# Patient Record
Sex: MALE | Race: BLACK OR AFRICAN AMERICAN | URBAN - METROPOLITAN AREA
[De-identification: names, ages, dates, MRNs, and addresses within clinical notes are randomized per-mention and may not be internally consistent; named-entity substitution may affect disease eponyms.]

---

## 2017-01-17 ENCOUNTER — TELEPHONE (OUTPATIENT)
Dept: FAMILY MEDICINE CLINIC | Age: 47
End: 2017-01-17

## 2017-01-26 LAB
25(OH)D3 SERPL-MCNC: 29.1 NG/ML (ref 32–100)
A-G RATIO,AGRAT: 1.4 RATIO (ref 1.1–2.6)
ABSOLUTE LYMPHOCYTE COUNT, 10803: 1.9 K/UL (ref 1–4.8)
ALBUMIN SERPL-MCNC: 4.6 G/DL (ref 3.5–5)
ALP SERPL-CCNC: 83 U/L (ref 25–115)
ALT SERPL-CCNC: 34 U/L (ref 5–40)
ANION GAP SERPL CALC-SCNC: 19 MMOL/L
AST SERPL W P-5'-P-CCNC: 30 U/L (ref 10–37)
AVG GLU, 10930: 125 MG/DL (ref 91–123)
BASOPHILS # BLD: 0 K/UL (ref 0–0.2)
BASOPHILS NFR BLD: 1 % (ref 0–2)
BILIRUB SERPL-MCNC: 0.2 MG/DL (ref 0.2–1.2)
BUN SERPL-MCNC: 16 MG/DL (ref 6–22)
CALCIUM SERPL-MCNC: 9.2 MG/DL (ref 8.4–10.4)
CHLORIDE SERPL-SCNC: 102 MMOL/L (ref 98–110)
CHOLEST SERPL-MCNC: 157 MG/DL (ref 110–200)
CO2 SERPL-SCNC: 22 MMOL/L (ref 20–32)
CREAT SERPL-MCNC: 0.9 MG/DL (ref 0.5–1.2)
EOSINOPHIL # BLD: 0.1 K/UL (ref 0–0.5)
EOSINOPHIL NFR BLD: 1 % (ref 0–6)
ERYTHROCYTE [DISTWIDTH] IN BLOOD BY AUTOMATED COUNT: 13.4 % (ref 10–16)
GFRAA, 66117: >60
GFRNA, 66118: >60
GLOBULIN,GLOB: 3.2 G/DL (ref 2–4)
GLUCOSE SERPL-MCNC: 92 MG/DL (ref 65–99)
GRANULOCYTES,GRANS: 47 % (ref 40–75)
HBA1C MFR BLD HPLC: 6 % (ref 4.8–5.9)
HCT VFR BLD AUTO: 42 % (ref 36.6–51.9)
HDLC SERPL-MCNC: 27 MG/DL (ref 40–59)
HGB BLD-MCNC: 14.2 G/DL (ref 13.2–17.3)
LDLC SERPL CALC-MCNC: 98 MG/DL (ref 50–99)
LYMPHOCYTES, LYMLT: 45 % (ref 27–45)
MCH RBC QN AUTO: 29 PG (ref 26–34)
MCHC RBC AUTO-ENTMCNC: 34 G/DL (ref 32–36)
MCV RBC AUTO: 85 FL (ref 80–95)
MONOCYTES # BLD: 0.3 K/UL (ref 0.1–0.9)
MONOCYTES NFR BLD: 6 % (ref 3–9)
NEUTROPHILS # BLD AUTO: 2 K/UL (ref 1.8–7.7)
PLATELET # BLD AUTO: 262 K/UL (ref 140–440)
PMV BLD AUTO: 10.3 FL (ref 6–10.8)
POTASSIUM SERPL-SCNC: 4.2 MMOL/L (ref 3.5–5.5)
PROT SERPL-MCNC: 7.8 G/DL (ref 6.4–8.3)
PSA SERPL-MCNC: 1.3 NG/ML
RBC # BLD AUTO: 4.96 M/UL (ref 3.8–5.8)
SODIUM SERPL-SCNC: 143 MMOL/L (ref 133–145)
T4 FREE SERPL-MCNC: 1.1 NG/DL (ref 0.9–1.8)
TRIGL SERPL-MCNC: 157 MG/DL (ref 40–149)
TSH SERPL DL<=0.005 MIU/L-ACNC: 1.89 MCU/ML (ref 0.27–4.2)
VLDLC SERPL CALC-MCNC: 31 MG/DL (ref 8–30)
WBC # BLD AUTO: 4.3 K/UL (ref 4–11)

## 2017-02-02 ENCOUNTER — OFFICE VISIT (OUTPATIENT)
Dept: FAMILY MEDICINE CLINIC | Age: 47
End: 2017-02-02

## 2017-02-02 VITALS
TEMPERATURE: 98.2 F | RESPIRATION RATE: 18 BRPM | DIASTOLIC BLOOD PRESSURE: 73 MMHG | HEIGHT: 68 IN | HEART RATE: 68 BPM | OXYGEN SATURATION: 97 % | BODY MASS INDEX: 29.77 KG/M2 | WEIGHT: 196.4 LBS | SYSTOLIC BLOOD PRESSURE: 136 MMHG

## 2017-02-02 DIAGNOSIS — Z23 ENCOUNTER FOR IMMUNIZATION: ICD-10-CM

## 2017-02-02 DIAGNOSIS — Z80.42 FAMILY HISTORY OF PROSTATE CANCER: ICD-10-CM

## 2017-02-02 DIAGNOSIS — Z00.00 ROUTINE HEALTH MAINTENANCE: Primary | ICD-10-CM

## 2017-02-02 LAB
PSA FREE MFR SERPL: 14 %
PSA FREE SERPL-MCNC: 0.14 NG/ML
PSA SERPL-MCNC: 1 NG/ML

## 2017-02-02 NOTE — MR AVS SNAPSHOT
Visit Information Date & Time Provider Department Dept. Phone Encounter #  
 2/2/2017 11:10 AM Shahla Yancey MD [email or web address] [phone] [id number] Upcoming Health Maintenance Date Due DTaP/Tdap/Td series (1 - Tdap) 1/30/1993 Allergies as of 2/2/2017  Review Complete On: 2/2/2017 By: Shahla Yancey MD  
 No Known Allergies Current Immunizations  Never Reviewed Name Date Tdap 2/2/2017 11:34 AM  
  
 Not reviewed this visit You Were Diagnosed With   
  
 Codes Comments Routine health maintenance    -  Primary ICD-10-CM: Z00.00 ICD-9-CM: V70.0 Encounter for immunization     ICD-10-CM: Q38 ICD-9-CM: V03.89 Vitals BP Pulse Temp Resp Height(growth percentile) Weight(growth percentile) 136/73 (BP 1 Location: Left arm, BP Patient Position: Sitting) 68 98.2 °F (36.8 °C) (Oral) 18 5' 8\" (1.727 m) 196 lb 6.4 oz (89.1 kg) SpO2 BMI Smoking Status 97% 29.86 kg/m2 Never Smoker Vitals History BMI and BSA Data Body Mass Index Body Surface Area  
 29.86 kg/m 2 2.07 m 2 Preferred Pharmacy Pharmacy Name Phone [facility] [address] [facility] Marcia Chappell 0001 [facility] 19 [phone] Your Updated Medication List  
  
   
This list is accurate as of: 2/2/17 11:57 AM.  Always use your most recent med list.  
  
  
  
  
 Ciclopirox-Nail Lacquer Removr 8 % Kit 1 Film by Apply Externally route every seven (7) days. Apply to adjacent skin and affected nails daily. Remove with alcohol every 7 days. FISH OIL 1,000 mg Cap Generic drug:  omega-3 fatty acids-vitamin e Take 1 Cap by mouth. ONE-A-DAY MEN'S MULTIVITAMIN PO Take  by mouth.  
  
 tadalafil 20 mg tablet Commonly known as:  CIALIS Take 20 mg by mouth as needed. We Performed the Following  TETANUS, DIPHTHERIA TOXOIDS AND ACELLULAR PERTUSSIS VACCINE (TDAP), IN INDIVIDS. >=7,  [id number] CPT(R)] Patient Instructions To Do: 
· Look at your multivitamin and see how much VitD is in each dose. And then. . I want to double it (using some extra Vitamin D) 333 [facility] Maxillofacial Surgery Gladys Nicholson, DDS Damaris Siddiqui, DMD [address] [place], [address] Tel: [phone] 
 
2400 [facility], [address] Tel: [phone] [facility] Maxillofacial Surgery [facility] Amy, [address] [facility] [address] [place], [address] Tel: [phone] [address] Tel: [phone] 
 - takes [address] for dental but not medical (TMJ is medical) [address] Oral Surgery Lurdesyannick Henderson, DDS Lorrane Cutting, DDS Rhesa Hook, [address] [place], [address] 
 
[address] [place], [address] Tel: [phone] Introducing \[facility] [place] [facility]\"" & HEALTH SERVICES! Art Gonzales introduces [employer] patient portal. Now you can access parts of your medical record, email your doctor's office, and request medication refills online. 1. In your internet browser, go to [email or web address] 2. Click on the First Time User? Click Here link in the Sign In box. You will see the New Member Sign Up page. 3. Enter your [employer] Access Code exactly as it appears below. You will not need to use this code after youve completed the sign-up process. If you do not sign up before the expiration date, you must request a new code. · [employer] Access Code: [email or web address] Expires: 5/3/2017 11:28 AM 
 
4. Enter the last four digits of your Social Security Number (xxxx) and Date of Birth (mm/dd/yyyy) as indicated and click Submit. You will be taken to the next sign-up page. 5. Create a MyChart ID. This will be your [employer] login ID and cannot be changed, so think of one that is secure and easy to remember. 6. Create a [employer] password. You can change your password at any time. 7. Enter your Password Reset Question and Answer. This can be used at a later time if you forget your password. 8. Enter your e-mail address. You will receive e-mail notification when new information is available in [address] 9. Click Sign Up. You can now view and download portions of your medical record. 10. Click the Download Summary menu link to download a portable copy of your medical information. If you have questions, please visit the Frequently Asked Questions section of the [employer] website. Remember, [employer] is NOT to be used for urgent needs. For medical emergencies, dial 911. Now available from your iPhone and Android! Please provide this summary of care documentation to your next provider. Your primary care clinician is listed as Gricelda Marlow. If you have any questions after today's visit, please call [phone].

## 2017-02-02 NOTE — PATIENT INSTRUCTIONS
To Do:  · Look at your multivitamin and see how much VitD is in each dose. And then. . I want to double it (using some extra Vitamin D)      1601 [facility]  JEN Myrick, AMY Nakinaa De [facility] [address] 2450 [facility], [address]  Tel: [phone]    113 [facility] Marshall [address]  Tel: [phone]    [facility]illofacial [facility]  JEN Hines [address], [address]  Tel: [phone]    [facility] [address]  [address]  [facility] [address]  Tel: [phone]   - takes [address] for dental but not medical (TMJ is medical)   400 [facility], 9500 [facility], [address]  Shayne Valdivia, [address] [place] [address], 310 [facility]    [address]  [facility], [address] [facility] [address]    Tel: [phone]

## 2017-02-02 NOTE — PROGRESS NOTES
3 [facility]  Primary Care Office Visit - Complete Physical    Milan Serna is a 41 y.o. male presenting for annual physical.  : 1970     Assessment/Plan:   Mallory Plasencia was seen today for complete physical.    Diagnoses and all orders for this visit:    Routine health maintenance  Up to date on health maintenance. Discussed role of PSA for prostate cancer screening in him, as someone who has a significant FHx of prostate cancer (father & PGF)    Encounter for immunization  -     Tetanus, diphtheria toxoids and acellular pertussis (TDAP) vaccine, in individuals >=7 years, IM      Management plan & patient instructions reviewed with Milan Serna, who voiced understanding. This document may have been created with the aid of dictation software. Text may contain errors, particularly phonetic errors. Sarah Navarrete MD  Internal Medicine, Family Medicine & Sports Medicine  2017, 11:44 AM    History:   Milan Serna is a 41 y.o. male presenting for an annual physical.    No complaints at all. Is exercising 5 days a week, 1-2 hours. Past Medical History   Diagnosis Date    Hypercholesterolemia      Past Surgical History   Procedure Laterality Date    Hx appendectomy  age 27      reports that he has never smoked. He has never used smokeless tobacco. He reports that he drinks alcohol. He reports that he does not use illicit drugs.   Social History     Social History Narrative     History   Smoking Status    Never Smoker   Smokeless Tobacco    Never Used     Family History   Problem Relation Age of Onset    Prostate Cancer Father      prostate     No Known Allergies    Problem List:      Patient Active Problem List    Diagnosis    Erectile dysfunction    Routine health maintenance    Onychomycosis of toenail    Family history of prostate cancer    Hyperlipidemia       Medications:     Current Outpatient Prescriptions   Medication Sig    MULTIVITS-MINERALS/FA/LYCOPENE (ONE-A-DAY MEN'S MULTIVITAMIN PO) Take  by mouth.  omega-3 fatty acids-vitamin e (FISH OIL) 1,000 mg cap Take 1 Cap by mouth.  tadalafil (CIALIS) 20 mg tablet Take 20 mg by mouth as needed.  Ciclopirox-Nail Lacquer Removr 8 % kit 1 Film by Apply Externally route every seven (7) days. Apply to adjacent skin and affected nails daily. Remove with alcohol every 7 days. No current facility-administered medications for this visit. Review of Systems:     Review of Systems   Constitutional: Negative. HENT: Negative. Eyes: Negative. Respiratory: Negative. Cardiovascular: Negative. Gastrointestinal: Negative. Genitourinary: Negative. Musculoskeletal: Negative. Skin: Negative. Neurological: Negative. Endo/Heme/Allergies: Negative. Psychiatric/Behavioral: Negative. Physical Assessment:   VS:    Visit Vitals    /73 (BP 1 Location: Left arm, BP Patient Position: Sitting)    Pulse 68    Temp 98.2 °F (36.8 °C) (Oral)    Resp 18    Ht 5' 8\" (1.727 m)    Wt 196 lb 6.4 oz (89.1 kg)    SpO2 97%    BMI 29.86 kg/m2       General:     Well-developed, well-nourished male, in NAD    Head:      PERRL, EOMI.  MMM, fair dentition, oropharynx otherwise WNL. No facial asymmetry noted. Ears:    Bilateral TMs wnl. Bilateral EACs wnl. Neck:      Neck supple, no thyromegaly  Cardiovasc:     No JVD. RRR, no MRG. Pulses 2+ and symmetric at distal extremities. Pulmonary:     Lungs clear bilaterally. Normal respiratory effort. Extremities:     No edema, no TTP bilateral calves. No joint effusions. LEs warm and well-perfused. Neuro:     Alert and oriented, CNs II-XII intact, no focal deficits. Skin:      No rashes noted. Psych:    pleasant, and conversant. Affect, mood & judgment appropriate.       Recent Labs & Imaging:     Lab Results   Component Value Date/Time    WBC 4.3 01/25/2017 01:17 PM    HGB 14.2 01/25/2017 01:17 PM    HCT 42.0 01/25/2017 01:17 PM PLATELET 766 74/44/8185 01:17 PM    MCV 85 01/25/2017 01:17 PM     Lab Results   Component Value Date/Time    Sodium 143 01/25/2017 01:17 PM    Potassium 4.2 01/25/2017 01:17 PM    Chloride 102 01/25/2017 01:17 PM    CO2 22 01/25/2017 01:17 PM    Anion gap 19.0 01/25/2017 01:17 PM    Glucose 92 01/25/2017 01:17 PM    BUN 16 01/25/2017 01:17 PM    Creatinine 0.9 01/25/2017 01:17 PM    BUN/Creatinine ratio 10 03/26/2014 12:00 AM    GFR est AA 90 03/26/2014 12:00 AM    GFR est non-AA 78 03/26/2014 12:00 AM    Calcium 9.2 01/25/2017 01:17 PM    Bilirubin, total 0.2 01/25/2017 01:17 PM    AST (SGOT) 30 01/25/2017 01:17 PM    Alk.  phosphatase 83 01/25/2017 01:17 PM    Protein, total 7.8 01/25/2017 01:17 PM    Albumin 4.6 01/25/2017 01:17 PM    Globulin 3.2 01/25/2017 01:17 PM    A-G Ratio 1.4 01/25/2017 01:17 PM    ALT (SGPT) 34 01/25/2017 01:17 PM     Lab Results   Component Value Date/Time    Cholesterol, total 157 01/25/2017 01:17 PM    HDL Cholesterol 27 01/25/2017 01:17 PM    LDL, calculated 98 01/25/2017 01:17 PM    VLDL, calculated 31 01/25/2017 01:17 PM    Triglyceride 157 01/25/2017 01:17 PM     Lab Results   Component Value Date/Time    TSH 1.89 01/25/2017 01:17 PM     Lab Results   Component Value Date/Time    Prostate Specific Ag 1.300 01/25/2017 01:17 PM    Prostate Specific Ag 1.0 01/25/2017 01:17 PM    Prostate Specific Ag 1.1 03/26/2014 12:00 AM     Lab Results   Component Value Date/Time    VITAMIN D, 25-HYDROXY 29.1 01/25/2017 01:17 PM

## 2017-02-02 NOTE — PROGRESS NOTES
Aby Thomas is a 41 y.o. male here for a cpe. Patient states he would like to be tested for prostate cancer and also would like some names of oral surgeons that remove wisdom teeth. 1. Have you been to the ER, urgent care clinic or hospitalized since your last visit? NO.     2. Have you seen or consulted any other health care providers outside of the [address] [place] [address] since your last visit (Include any pap smears or colon screening)? NO      Do you have an Advanced Directive? NO    Would you like information on Advanced Directives?  NO    Learning Assessment 3/12/2014   PRIMARY LEARNER Patient   HIGHEST LEVEL OF EDUCATION - PRIMARY LEARNER  GRADUATED HIGH SCHOOL OR GED   BARRIERS PRIMARY LEARNER NONE   PRIMARY LANGUAGE ENGLISH   LEARNER PREFERENCE PRIMARY READING   ANSWERED BY self   RELATIONSHIP SELF

## 2017-11-28 ENCOUNTER — OFFICE VISIT (OUTPATIENT)
Dept: FAMILY MEDICINE CLINIC | Age: 47
End: 2017-11-28

## 2017-11-28 VITALS
HEIGHT: 68 IN | WEIGHT: 192.8 LBS | BODY MASS INDEX: 29.22 KG/M2 | HEART RATE: 85 BPM | SYSTOLIC BLOOD PRESSURE: 138 MMHG | RESPIRATION RATE: 20 BRPM | OXYGEN SATURATION: 96 % | DIASTOLIC BLOOD PRESSURE: 89 MMHG | TEMPERATURE: 98.4 F

## 2017-11-28 DIAGNOSIS — J40 BRONCHITIS: Primary | ICD-10-CM

## 2017-11-28 DIAGNOSIS — R05.9 COUGH: ICD-10-CM

## 2017-11-28 DIAGNOSIS — H66.90 EAR INFECTION: ICD-10-CM

## 2017-11-28 RX ORDER — ALBUTEROL SULFATE 90 UG/1
1-2 AEROSOL, METERED RESPIRATORY (INHALATION)
Qty: 1 INHALER | Refills: 2 | Status: SHIPPED | OUTPATIENT
Start: 2017-11-28 | End: 2018-03-06 | Stop reason: SDUPTHER

## 2017-11-28 RX ORDER — AMOXICILLIN AND CLAVULANATE POTASSIUM 875; 125 MG/1; MG/1
1 TABLET, FILM COATED ORAL DAILY
Qty: 10 TAB | Refills: 0 | Status: SHIPPED | OUTPATIENT
Start: 2017-11-28 | End: 2017-12-08

## 2017-11-28 RX ORDER — ALBUTEROL SULFATE 0.83 MG/ML
2.5 SOLUTION RESPIRATORY (INHALATION) ONCE
Qty: 1 EACH | Refills: 0
Start: 2017-11-28 | End: 2022-06-10 | Stop reason: SDUPTHER

## 2017-11-28 NOTE — PROGRESS NOTES
[facility]  Primary Care Office Visit - Problem-Oriented    : 1970   Daya Camargo is a 41 y.o. male presenting for  Chief Complaint   Patient presents with    Hoarse     x 4-5 days    Cold Symptoms       Assessment/Plan:       ICD-10-CM ICD-9-CM   1. Bronchitis - initial encounter J40 490   2. Cough - initial encounter R05 786.2   3. Ear infection - initial encounter H66.90 382.9     Start ABx (augmentin), albuterol prn with spacer. Given instructions re: self care. Follow-up 2 weeks. Orders Placed This Encounter    INHAL RX, AIRWAY OBST/DX SPUTUM INDUCT ([id number])    ALBUTEROL, INHAL. RAI.()     Order Specific Question:   Dose     Answer:   2.5 mg     Order Specific Question:   Site     Answer:   OTHER     Comments:   oral inhalation     Order Specific Question:   Expiration Date     Answer:   2019     Order Specific Question:   Lot#     Answer:   [id number]     Order Specific Question:        Answer:   [facility] [employer]     Order Specific Question:   Charge Quantity? Answer:   1     Order Specific Question:   Perfomed by/Witnessed by: Answer:   BANDAR Augustin LPN     Order Specific Question:   NDC#     Answer:   5895-4100-74    albuterol (PROVENTIL VENTOLIN) 2.5 mg /3 mL (0.083 %) nebulizer solution     Sig: 3 mL by Nebulization route once for 1 dose. Dispense:  1 Each     Refill:  0    albuterol (PROVENTIL HFA, VENTOLIN HFA, PROAIR HFA) 90 mcg/actuation inhaler     Sig: Take 1-2 Puffs by inhalation every six (6) hours as needed for Wheezing or Shortness of Breath (cough). Dispense:  1 Inhaler     Refill:  2    amoxicillin-clavulanate (AUGMENTIN) 875-125 mg per tablet     Sig: Take 1 Tab by mouth daily for 10 days. Dispense:  10 Tab     Refill:  0    inhalational spacing device (AEROCHAMBER MV)     Si Each by Does Not Apply route as needed.      Dispense:  1 Device     Refill:  0         This document may have been created with the aid of dictation software. Text may contain errors, particularly phonetic errors. Reviewed management plan & instructions with patient, who voiced understanding. Kenzie Mera MD  Internal Medicine, Family Medicine & Sports Medicine  11/28/2017, 2:00 PM    Patient Instructions (provided in AVS): To Do:  ·  start your antibiotics  · Use your albuterol as needed with spacer    Bronchitis: Care Instructions  Ear Infection (Otitis Media): Care Instructions    History:   Jana Damico is a 41 y.o. male presenting to address:  Chief Complaint   Patient presents with    Hoarse     x 4-5 days    Cold Symptoms       Cough with dyspnea and wheeze x 4-5 days. Runny nose  + subjective fever    Past Medical History:   Diagnosis Date    Hypercholesterolemia      Past Surgical History:   Procedure Laterality Date    HX APPENDECTOMY  age 27      reports that he has never smoked. He has never used smokeless tobacco. He reports that he drinks alcohol. He reports that he does not use illicit drugs. Social History     Social History Narrative     History   Smoking Status    Never Smoker   Smokeless Tobacco    Never Used     Family History   Problem Relation Age of Onset    Prostate Cancer Father      prostate     No Known Allergies    Problem List:      Patient Active Problem List    Diagnosis    Erectile dysfunction    Routine health maintenance    Onychomycosis of toenail    Family history of prostate cancer       Medications:     Current Outpatient Prescriptions   Medication Sig    MULTIVITS-MINERALS/FA/LYCOPENE (ONE-A-DAY MEN'S MULTIVITAMIN PO) Take  by mouth.  omega-3 fatty acids-vitamin e (FISH OIL) 1,000 mg cap Take 1 Cap by mouth.  tadalafil (CIALIS) 20 mg tablet Take 20 mg by mouth as needed.  Ciclopirox-Nail Lacquer Removr 8 % kit 1 Film by Apply Externally route every seven (7) days. Apply to adjacent skin and affected nails daily. Remove with alcohol every 7 days.      No current facility-administered medications for this visit. Review of Systems:     Review of Systems   Constitutional: Positive for fever and malaise/fatigue. Negative for chills. Eyes: Positive for redness. Respiratory: Positive for cough, shortness of breath and wheezing. Gastrointestinal: Negative for abdominal pain. Musculoskeletal: Negative. Skin: Negative for rash. Neurological: Negative. Psychiatric/Behavioral: Negative. Physical Assessment:   VS:    Vitals:    11/28/17 1340 11/28/17 1410   BP: (!) 163/92 138/89   Pulse: 85    Resp: 20    Temp: 98.4 °F (36.9 °C)    TempSrc: Oral    SpO2: 96%    Weight: 192 lb 12.8 oz (87.5 kg)    Height: 5' 8\" (1.727 m)    PF:  420 L/min   PainSc:   8    PainLoc: Throat        Physical Exam   Constitutional: He is oriented to person, place, and time. He appears well-developed and well-nourished. He appears ill. No distress. HENT:   Head: Normocephalic and atraumatic. Right Ear: Tympanic membrane is erythematous. Tympanic membrane mobility is abnormal.   Left Ear: Tympanic membrane is erythematous. Tympanic membrane mobility is abnormal.   Mouth/Throat: Posterior oropharyngeal erythema present. Eyes: EOM are normal. Pupils are equal, round, and reactive to light. Right conjunctiva is injected. Neck: Normal range of motion. Neck supple. Cardiovascular: Normal rate, regular rhythm and normal heart sounds. No murmur heard. Pulmonary/Chest: Effort normal. No respiratory distress. He has decreased breath sounds. He has no wheezes. Significantly improved aeration B lung fields after albuterol neb treatment x 1   Abdominal: Soft. Bowel sounds are normal. There is no tenderness. There is no rebound. Musculoskeletal: Normal range of motion. Lymphadenopathy:        Head (right side): Submandibular and tonsillar adenopathy present. Head (left side): Tonsillar adenopathy present. No submandibular adenopathy present.      He has cervical adenopathy. Right cervical: Superficial cervical adenopathy present. Left cervical: No superficial cervical adenopathy present. + tender lymphadenopathy   Neurological: He is oriented to person, place, and time. No cranial nerve deficit. Skin: Skin is warm and dry. He is not diaphoretic. Psychiatric: He has a normal mood and affect. His behavior is normal. Judgment and thought content normal.   Nursing note reviewed.

## 2017-11-28 NOTE — PATIENT INSTRUCTIONS
To Do:  ·  start your antibiotics  · Use your albuterol as needed with spacer         Bronchitis: Care Instructions  Your Care Instructions    Bronchitis is inflammation of the bronchial tubes, which carry air to the lungs. The tubes swell and produce mucus, or phlegm. The mucus and inflamed bronchial tubes make you cough. You may have trouble breathing. Most cases of bronchitis are caused by viruses like those that cause colds. Antibiotics usually do not help and they may be harmful. Bronchitis usually develops rapidly and lasts about 2 to 3 weeks in otherwise healthy people. Follow-up care is a key part of your treatment and safety. Be sure to make and go to all appointments, and call your doctor if you are having problems. It's also a good idea to know your test results and keep a list of the medicines you take. How can you care for yourself at home? · Take all medicines exactly as prescribed. Call your doctor if you think you are having a problem with your medicine. · Get some extra rest.  · Take an over-the-counter pain medicine, such as acetaminophen (Tylenol), ibuprofen (Advil, Motrin), or naproxen (Aleve) to reduce fever and relieve body aches. Read and follow all instructions on the label. · Do not take two or more pain medicines at the same time unless the doctor told you to. Many pain medicines have acetaminophen, which is Tylenol. Too much acetaminophen (Tylenol) can be harmful. · Take an over-the-counter cough medicine that contains dextromethorphan to help quiet a dry, hacking cough so that you can sleep. Avoid cough medicines that have more than one active ingredient. Read and follow all instructions on the label. · Breathe moist air from a humidifier, hot shower, or sink filled with hot water. The heat and moisture will thin mucus so you can cough it out. · Do not smoke. Smoking can make bronchitis worse.  If you need help quitting, talk to your doctor about stop-smoking programs and medicines. These can increase your chances of quitting for good. When should you call for help? Call 911 anytime you think you may need emergency care. For example, call if:  ? · You have severe trouble breathing. ?Call your doctor now or seek immediate medical care if:  ? · You have new or worse trouble breathing. ? · You cough up dark brown or bloody mucus (sputum). ? · You have a new or higher fever. ? · You have a new rash. ? Watch closely for changes in your health, and be sure to contact your doctor if:  ? · You cough more deeply or more often, especially if you notice more mucus or a change in the color of your mucus. ? · You are not getting better as expected. Where can you learn more? Go to http://surendra-campos.info/. Enter H333 in the search box to learn more about \"Bronchitis: Care Instructions. \"  Current as of: May 12, 2017  Content Version: 11.4  © 5174-3044 [employer]. Care instructions adapted under license by [employer] (which disclaims liability or warranty for this information). If you have questions about a medical condition or this instruction, always ask your healthcare professional. [place] [address] any warranty or liability for your use of this information. Ear Infection (Otitis Media): Care Instructions  Your Care Instructions    An ear infection may start with a cold and affect the middle ear (otitis media). It can hurt a lot. Most ear infections clear up on their own in a couple of days. Most often you will not need antibiotics. This is because many ear infections are caused by a virus. Antibiotics don't work against a virus. Regular doses of pain medicines are the best way to reduce your fever and help you feel better. Follow-up care is a key part of your treatment and safety. Be sure to make and go to all appointments, and call your doctor if you are having problems.  It's also a good idea to know your test results and keep a list of the medicines you take. How can you care for yourself at home? · Take pain medicines exactly as directed. ¨ If the doctor gave you a prescription medicine for pain, take it as prescribed. ¨ If you are not taking a prescription pain medicine, take an over-the-counter medicine, such as acetaminophen (Tylenol), ibuprofen (Advil, Motrin), or naproxen (Aleve). Read and follow all instructions on the label. ¨ Do not take two or more pain medicines at the same time unless the doctor told you to. Many pain medicines have acetaminophen, which is Tylenol. Too much acetaminophen (Tylenol) can be harmful. · Plan to take a full dose of pain reliever before bedtime. Getting enough sleep will help you get better. · Try a warm, moist washcloth on the ear. It may help relieve pain. · If your doctor prescribed antibiotics, take them as directed. Do not stop taking them just because you feel better. You need to take the full course of antibiotics. When should you call for help? Call your doctor now or seek immediate medical care if:  ? · You have new or increasing ear pain. ? · You have new or increasing pus or blood draining from your ear. ? · You have a fever with a stiff neck or a severe headache. ? Watch closely for changes in your health, and be sure to contact your doctor if:  ? · You have new or worse symptoms. ? · You are not getting better after taking an antibiotic for 2 days. Where can you learn more? Go to http://surendra-campos.info/. Enter W577 in the search box to learn more about \"Ear Infection (Otitis Media): Care Instructions. \"  Current as of: May 12, 2017  Content Version: 11.4  © 4470-3680 [employer]andbase. Care instructions adapted under license by [employer] (which disclaims liability or warranty for this information).  If you have questions about a medical condition or this instruction, always ask your healthcare professional. [address] any warranty or liability for your use of this information.

## 2017-11-28 NOTE — PROGRESS NOTES
Rona Fernandez is a 41 y.o. male (: 1970) presenting to address:    Chief Complaint   Patient presents with    Hoarse     x 4-5 days    Cold Symptoms       Vitals:    17 1340   Weight: 192 lb 12.8 oz (87.5 kg)   Height: 5' 8\" (1.727 m)   PainSc:   8   PainLoc: Throat       Hearing/Vision:   No exam data present    Learning Assessment:     Learning Assessment 3/12/2014   PRIMARY LEARNER Patient   HIGHEST LEVEL OF EDUCATION - PRIMARY LEARNER  GRADUATED HIGH SCHOOL OR GED   BARRIERS PRIMARY LEARNER NONE   PRIMARY LANGUAGE ENGLISH   LEARNER PREFERENCE PRIMARY READING   ANSWERED BY self   RELATIONSHIP SELF     Depression Screening:     PHQ over the last two weeks 2017   Little interest or pleasure in doing things Not at all   Feeling down, depressed or hopeless Not at all   Total Score PHQ 2 0     Fall Risk Assessment:   No flowsheet data found. Abuse Screening:     Abuse Screening Questionnaire 3/12/2014   Do you ever feel afraid of your partner? N   Are you in a relationship with someone who physically or mentally threatens you? N   Is it safe for you to go home? Y     Coordination of Care Questionaire:   1. Have you been to the ER, urgent care clinic since your last visit? Hospitalized since your last visit? NO    2. Have you seen or consulted any other health care providers outside of the [address] [place] [address] since your last visit? Include any pap smears or colon screening. NO    Advanced Directive:   1. Do you have an Advanced Directive? NO    2. Would you like information on Advanced Directives?  NO

## 2017-11-28 NOTE — MR AVS SNAPSHOT
Visit Information Date & Time Provider Department Dept. Phone Encounter #  
 11/28/2017  1:50 PM Daniel Byers, [facility] [phone] [id number] Upcoming Health Maintenance Date Due Influenza Age 5 to Adult 8/1/2017 DTaP/Tdap/Td series (2 - Td) 2/2/2027 Allergies as of 11/28/2017  Review Complete On: 11/28/2017 By: Daniel Byers MD  
 No Known Allergies Current Immunizations  Never Reviewed Name Date Tdap 2/2/2017 11:34 AM  
  
 Not reviewed this visit You Were Diagnosed With   
  
 Codes Comments Bronchitis    -  Primary ICD-10-CM: W70 ICD-9-CM: 469 Cough     ICD-10-CM: R05 ICD-9-CM: 786.2 Ear infection     ICD-10-CM: H66.90 ICD-9-CM: 382. 9 Vitals BP Pulse Temp Resp Height(growth percentile) Weight(growth percentile) 138/89 (BP 1 Location: Right arm, BP Patient Position: Sitting) 85 98.4 °F (36.9 °C) (Oral) 20 5' 8\" (1.727 m) 192 lb 12.8 oz (87.5 kg) SpO2 PF BMI Smoking Status 96% 420 L/min 29.32 kg/m2 Never Smoker Vitals History BMI and BSA Data Body Mass Index Body Surface Area  
 29.32 kg/m 2 2.05 m 2 Preferred Pharmacy Pharmacy Name Phone [facility] [address] [address] [phone] Your Updated Medication List  
  
   
This list is accurate as of: 11/28/17  2:45 PM.  Always use your most recent med list.  
  
  
  
  
 * albuterol 2.5 mg /3 mL (0.083 %) nebulizer solution Commonly known as:  PROVENTIL VENTOLIN  
3 mL by Nebulization route once for 1 dose. * albuterol 90 mcg/actuation inhaler Commonly known as:  PROVENTIL HFA, VENTOLIN HFA, PROAIR HFA Take 1-2 Puffs by inhalation every six (6) hours as needed for Wheezing or Shortness of Breath (cough). amoxicillin-clavulanate 875-125 mg per tablet Commonly known as:  AUGMENTIN Take 1 Tab by mouth daily for 10 days. Ciclopirox-Nail Lacquer Removr 8 % Kit 1 Film by Apply Externally route every seven (7) days. Apply to adjacent skin and affected nails daily. Remove with alcohol every 7 days. FISH OIL 1,000 mg Cap Generic drug:  omega-3 fatty acids-vitamin e Take 1 Cap by mouth. inhalational spacing device Commonly known as:  AEROCHAMBER MV  
1 Each by Does Not Apply route as needed. ONE-A-DAY MEN'S MULTIVITAMIN PO Take  by mouth.  
  
 tadalafil 20 mg tablet Commonly known as:  CIALIS Take 20 mg by mouth as needed. * Notice: This list has 2 medication(s) that are the same as other medications prescribed for you. Read the directions carefully, and ask your doctor or other care provider to review them with you. Prescriptions Sent to Pharmacy Refills  
 albuterol (PROVENTIL HFA, VENTOLIN HFA, PROAIR HFA) 90 mcg/actuation inhaler 2 Sig: Take 1-2 Puffs by inhalation every six (6) hours as needed for Wheezing or Shortness of Breath (cough). Class: Normal  
 Pharmacy: [facility] [place] [address] [facility] Ph #: [phone] Route: Inhalation  
 amoxicillin-clavulanate (AUGMENTIN) 875-125 mg per tablet 0 Sig: Take 1 Tab by mouth daily for 10 days. Class: Normal  
 Pharmacy: [facility] [place] [address] [facility] Ph #: [phone] Route: Oral  
 inhalational spacing device (AEROCHAMBER MV) 0 Si Each by Does Not Apply route as needed. Class: Normal  
 Pharmacy: [facility] [place] [address] [facility] Ph #: [phone] Route: Does Not Apply We Performed the Following ALBUTEROL, INHAL. SOL., FDA-APPROVED FINAL, NON-COMPOUND UNIT DOSE, 1 MG [ [facility]] INHAL RX, AIRWAY OBST/DX SPUTUM INDUCT [id number] CPT(R)] Patient Instructions To Do: 
·  start your antibiotics · Use your albuterol as needed with spacer Bronchitis: Care Instructions Your Care Instructions Bronchitis is inflammation of the bronchial tubes, which carry air to the lungs. The tubes swell and produce mucus, or phlegm. The mucus and inflamed bronchial tubes make you cough. You may have trouble breathing. Most cases of bronchitis are caused by viruses like those that cause colds. Antibiotics usually do not help and they may be harmful. Bronchitis usually develops rapidly and lasts about 2 to 3 weeks in otherwise healthy people. Follow-up care is a key part of your treatment and safety. Be sure to make and go to all appointments, and call your doctor if you are having problems. It's also a good idea to know your test results and keep a list of the medicines you take. How can you care for yourself at home? · Take all medicines exactly as prescribed. Call your doctor if you think you are having a problem with your medicine. · Get some extra rest. 
· Take an over-the-counter pain medicine, such as acetaminophen (Tylenol), ibuprofen (Advil, Motrin), or naproxen (Aleve) to reduce fever and relieve body aches. Read and follow all instructions on the label. · Do not take two or more pain medicines at the same time unless the doctor told you to. Many pain medicines have acetaminophen, which is Tylenol. Too much acetaminophen (Tylenol) can be harmful. · Take an over-the-counter cough medicine that contains dextromethorphan to help quiet a dry, hacking cough so that you can sleep. Avoid cough medicines that have more than one active ingredient. Read and follow all instructions on the label. · Breathe moist air from a humidifier, hot shower, or sink filled with hot water. The heat and moisture will thin mucus so you can cough it out. · Do not smoke. Smoking can make bronchitis worse. If you need help quitting, talk to your doctor about stop-smoking programs and medicines. These can increase your chances of quitting for good. When should you call for help? Call 911 anytime you think you may need emergency care. For example, call if: 
? · You have severe trouble breathing. ?Call your doctor now or seek immediate medical care if: 
? · You have new or worse trouble breathing. ? · You cough up dark brown or bloody mucus (sputum). ? · You have a new or higher fever. ? · You have a new rash. ? Watch closely for changes in your health, and be sure to contact your doctor if: 
? · You cough more deeply or more often, especially if you notice more mucus or a change in the color of your mucus. ? · You are not getting better as expected. Where can you learn more? Go to http://surendra-campos.info/. Enter H333 in the search box to learn more about \"Bronchitis: Care Instructions. \" Current as of: May 12, 2017 Content Version: 11.4 © 9053-5055 iSpecimen. Care instructions adapted under license by [employer] (which disclaims liability or warranty for this information). If you have questions about a medical condition or this instruction, always ask your healthcare professional. [place] [address] any warranty or liability for your use of this information. Ear Infection (Otitis Media): Care Instructions Your Care Instructions An ear infection may start with a cold and affect the middle ear (otitis media). It can hurt a lot. Most ear infections clear up on their own in a couple of days. Most often you will not need antibiotics. This is because many ear infections are caused by a virus. Antibiotics don't work against a virus. Regular doses of pain medicines are the best way to reduce your fever and help you feel better. Follow-up care is a key part of your treatment and safety.  Be sure to make and go to all appointments, and call your doctor if you are having problems. It's also a good idea to know your test results and keep a list of the medicines you take. How can you care for yourself at home? · Take pain medicines exactly as directed. ¨ If the doctor gave you a prescription medicine for pain, take it as prescribed. ¨ If you are not taking a prescription pain medicine, take an over-the-counter medicine, such as acetaminophen (Tylenol), ibuprofen (Advil, Motrin), or naproxen (Aleve). Read and follow all instructions on the label. ¨ Do not take two or more pain medicines at the same time unless the doctor told you to. Many pain medicines have acetaminophen, which is Tylenol. Too much acetaminophen (Tylenol) can be harmful. · Plan to take a full dose of pain reliever before bedtime. Getting enough sleep will help you get better. · Try a warm, moist washcloth on the ear. It may help relieve pain. · If your doctor prescribed antibiotics, take them as directed. Do not stop taking them just because you feel better. You need to take the full course of antibiotics. When should you call for help? Call your doctor now or seek immediate medical care if: 
? · You have new or increasing ear pain. ? · You have new or increasing pus or blood draining from your ear. ? · You have a fever with a stiff neck or a severe headache. ? Watch closely for changes in your health, and be sure to contact your doctor if: 
? · You have new or worse symptoms. ? · You are not getting better after taking an antibiotic for 2 days. Where can you learn more? Go to http://surendra-campos.info/. Enter I779 in the search box to learn more about \"Ear Infection (Otitis Media): Care Instructions. \" Current as of: May 12, 2017 Content Version: 11.4 © 0464-0083 [employer]ee. Care instructions adapted under license by [employer] (which disclaims liability or warranty for this information).  If you have questions about a medical condition or this instruction, always ask your healthcare professional. [facility] any warranty or liability for your use of this information. Introducing [facility]! Jerod Dimas introduces Appydrink patient portal. Now you can access parts of your medical record, email your doctor's office, and request medication refills online. 1. In your internet browser, go to [email or web address] 2. Click on the First Time User? Click Here link in the Sign In box. You will see the New Member Sign Up page. 3. Enter your Appydrink Access Code exactly as it appears below. You will not need to use this code after youve completed the sign-up process. If you do not sign up before the expiration date, you must request a new code. · Appydrink Access Code: [email or web address] Expires: 2/26/2018  2:45 PM 
 
4. Enter the last four digits of your Social Security Number (xxxx) and Date of Birth (mm/dd/yyyy) as indicated and click Submit. You will be taken to the next sign-up page. 5. Create a Appydrink ID. This will be your Appydrink login ID and cannot be changed, so think of one that is secure and easy to remember. 6. Create a Appydrink password. You can change your password at any time. 7. Enter your Password Reset Question and Answer. This can be used at a later time if you forget your password. 8. Enter your e-mail address. You will receive e-mail notification when new information is available in [address] 9. Click Sign Up. You can now view and download portions of your medical record. 10. Click the Download Summary menu link to download a portable copy of your medical information. If you have questions, please visit the Frequently Asked Questions section of the Appydrink website. Remember, Appydrink is NOT to be used for urgent needs. For medical emergencies, dial 911. Now available from your iPhone and Android! Please provide this summary of care documentation to your next provider. Your primary care clinician is listed as Sabrina Ruvalcaba. If you have any questions after today's visit, please call [phone].

## 2017-12-26 ENCOUNTER — OFFICE VISIT (OUTPATIENT)
Dept: FAMILY MEDICINE CLINIC | Age: 47
End: 2017-12-26

## 2017-12-26 VITALS
BODY MASS INDEX: 28.76 KG/M2 | TEMPERATURE: 97.8 F | HEART RATE: 69 BPM | SYSTOLIC BLOOD PRESSURE: 124 MMHG | OXYGEN SATURATION: 98 % | WEIGHT: 189.8 LBS | HEIGHT: 68 IN | DIASTOLIC BLOOD PRESSURE: 73 MMHG | RESPIRATION RATE: 18 BRPM

## 2017-12-26 DIAGNOSIS — R03.0 ELEVATED BLOOD PRESSURE READING: ICD-10-CM

## 2017-12-26 DIAGNOSIS — R06.83 SNORING: ICD-10-CM

## 2017-12-26 DIAGNOSIS — L98.9 SKIN LESION OF FOOT: ICD-10-CM

## 2017-12-26 DIAGNOSIS — B35.1 ONYCHOMYCOSIS OF TOENAIL: Primary | ICD-10-CM

## 2017-12-26 DIAGNOSIS — R53.83 FATIGUE, UNSPECIFIED TYPE: ICD-10-CM

## 2017-12-26 NOTE — PROGRESS NOTES
[address] [facility]  Primary Care Office Visit - Problem-Oriented    : 1970   Bennett Ortiz is a 41 y.o. male presenting for  Chief Complaint   Patient presents with    Cough     follow up. \"doing much better, still have mucus\"       Assessment/Plan:       ICD-10-CM   1. Onychomycosis of toenail, right - ongoing  - unresponsive to topical antifungals in the past, requesting podiatry referral     B35.1   2. Skin lesion of foot - ongoing  - likely tinea, requesting podiatry referral     L98.9   3. Snoring - new issue R06.83   4. Fatigue, unspecified type - new issue, daytime sleepiness R53.83   5. Elevated blood pressure reading in the past - normotensive today  - sleep medicine for eval R03.0       Orders Placed This Encounter    REFERRAL TO PODIATRY     Referral Priority:   Routine     Referral Type:   Consultation     Referral Reason:   Specialty Services Required     Referred to Provider:   Robert Green DPM    SLEEP MEDICINE REFERRAL     Referral Priority:   Routine     Referral Type:   Consultation     Referral Reason:   Specialty Services Required     Referral Location:   [facility]     Referred to Provider:   Amber Rubio MD         This document may have been created with the aid of dictation software. Text may contain errors, particularly phonetic errors. Reviewed management plan & instructions with patient, who voiced understanding. Guy Sams MD  Internal Medicine, Family Medicine & Sports Medicine  2017, 7:45 AM    Patient Instructions (provided in AVS): To Do: · The podiatrist & the sleep specialist should be calling you in the next 1-2 weeks. If you don't hear from them, you can call them directly. Toenail Fungus: Care Instructions  Snoring: Care Instructions    History:   Bennett Ortiz is a 41 y.o. male presenting to address:  Chief Complaint   Patient presents with    Cough     follow up.   \"doing much better, still have mucus\"       Ongoing cough, although much better than before. Rare use of albuterol. Concerned about his R foot, mainly ongoing great toenail fungus, occasionally painful. Also concerned about ongoing R skin issues. Would like a referral to sleep specialist.  + snoring  Does not have anyone to ask re: witnessed apnea while sleeping. Does wake up feeling unrested. Past Medical History:   Diagnosis Date    Hypercholesterolemia      Past Surgical History:   Procedure Laterality Date    HX APPENDECTOMY  age 27      reports that he has never smoked. He has never used smokeless tobacco. He reports that he drinks alcohol. He reports that he does not use illicit drugs. Social History     Social History Narrative     History   Smoking Status    Never Smoker   Smokeless Tobacco    Never Used     Family History   Problem Relation Age of Onset    Prostate Cancer Father      prostate     No Known Allergies    Problem List:      Patient Active Problem List    Diagnosis    Erectile dysfunction    Routine health maintenance    Onychomycosis of toenail    Family history of prostate cancer       Medications:     Current Outpatient Prescriptions   Medication Sig    albuterol (PROVENTIL HFA, VENTOLIN HFA, PROAIR HFA) 90 mcg/actuation inhaler Take 1-2 Puffs by inhalation every six (6) hours as needed for Wheezing or Shortness of Breath (cough).  inhalational spacing device (AEROCHAMBER MV) 1 Each by Does Not Apply route as needed.  MULTIVITS-MINERALS/FA/LYCOPENE (ONE-A-DAY MEN'S MULTIVITAMIN PO) Take  by mouth.  omega-3 fatty acids-vitamin e (FISH OIL) 1,000 mg cap Take 1 Cap by mouth. No current facility-administered medications for this visit. Review of Systems:     Review of Systems   Constitutional: Negative for chills and fever. HENT: Negative for ear pain and sore throat. Respiratory: Positive for cough (much improved from before). Negative for shortness of breath. Cardiovascular: Negative for chest pain and palpitations. Gastrointestinal: Negative for abdominal pain. Genitourinary: Negative for dysuria. Musculoskeletal: Negative for myalgias. Skin: Positive for rash (R foot, nail & skin). Neurological: Negative for speech change, focal weakness and headaches. Endo/Heme/Allergies: Does not bruise/bleed easily. Psychiatric/Behavioral: Negative for depression. The patient is not nervous/anxious and does not have insomnia. Physical Assessment:   VS:    Vitals:    12/26/17 0735   BP: 124/73   Pulse: 69   Resp: 18   Temp: 97.8 °F (36.6 °C)   TempSrc: Oral   SpO2: 98%   Weight: 189 lb 12.8 oz (86.1 kg)   Height: 5' 8\" (1.727 m)   PainSc:   0 - No pain       Physical Exam   Constitutional: He is oriented to person, place, and time. He appears well-developed and well-nourished. No distress. HENT:   Head: Normocephalic and atraumatic. Right Ear: External ear normal.   Left Ear: External ear normal.   Mouth/Throat: Oropharynx is clear and moist.   Eyes: EOM are normal. Pupils are equal, round, and reactive to light. Neck: Normal range of motion. Neck supple. Cardiovascular: Normal rate, regular rhythm and normal heart sounds. No murmur heard. Pulmonary/Chest: Effort normal and breath sounds normal. No respiratory distress. He has no wheezes. Abdominal: Soft. Bowel sounds are normal. There is no tenderness. There is no rebound. Musculoskeletal: Normal range of motion. Neurological: He is alert and oriented to person, place, and time. No cranial nerve deficit. Skin: Skin is warm and dry. Rash noted. He is not diaphoretic.   + areas of localized scaling of R foot, including plantar surface  + R great toenail thickened, discolored, brittle   Psychiatric: He has a normal mood and affect. His behavior is normal. Judgment and thought content normal.   Nursing note reviewed.

## 2017-12-26 NOTE — MR AVS SNAPSHOT
Visit Information Date & Time Provider Department Dept. Phone Encounter #  
 12/26/2017  7:30 AM Farheen Melinda, [facility] [phone] [id number] Follow-up Instructions Return if symptoms worsen or fail to improve. Upcoming Health Maintenance Date Due DTaP/Tdap/Td series (2 - Td) 2/2/2027 Allergies as of 12/26/2017  Review Complete On: 12/26/2017 By: Farheen Lawrence MD  
 No Known Allergies Current Immunizations  Never Reviewed Name Date Tdap 2/2/2017 11:34 AM  
  
 Not reviewed this visit You Were Diagnosed With   
  
 Codes Comments Onychomycosis of toenail    -  Primary ICD-10-CM: B35.1 ICD-9-CM: 110.1 Skin lesion of foot     ICD-10-CM: L98.9 ICD-9-CM: 709.9 Snoring     ICD-10-CM: R06.83 
ICD-9-CM: 786.09 Fatigue, unspecified type     ICD-10-CM: R53.83 ICD-9-CM: 780.79 Elevated blood pressure reading     ICD-10-CM: R03.0 ICD-9-CM: 796.2 Vitals BP Pulse Temp Resp Height(growth percentile) Weight(growth percentile) 124/73 (BP 1 Location: Left arm, BP Patient Position: Sitting) 69 97.8 °F (36.6 °C) (Oral) 18 5' 8\" (1.727 m) 189 lb 12.8 oz (86.1 kg) SpO2 BMI Smoking Status 98% 28.86 kg/m2 Never Smoker Vitals History BMI and BSA Data Body Mass Index Body Surface Area  
 28.86 kg/m 2 2.03 m 2 Preferred Pharmacy Pharmacy Name Phone [facility] [phone] [phone] Your Updated Medication List  
  
   
This list is accurate as of: 12/26/17  7:54 AM.  Always use your most recent med list.  
  
  
  
  
 albuterol 90 mcg/actuation inhaler Commonly known as:  PROVENTIL HFA, VENTOLIN HFA, PROAIR HFA Take 1-2 Puffs by inhalation every six (6) hours as needed for Wheezing or Shortness of Breath (cough). FISH OIL 1,000 mg Cap Generic drug:  omega-3 fatty acids-vitamin e  
 Take 1 Cap by mouth. inhalational spacing device Commonly known as:  AEROCHAMBER MV  
1 Each by Does Not Apply route as needed. ONE-A-DAY MEN'S MULTIVITAMIN PO Take  by mouth. We Performed the Following REFERRAL TO PODIATRY [REF90 Custom] SLEEP MEDICINE REFERRAL [LQE942 Custom] Comments:  
 Orders: 
Sleep Medicine Consult - Schedule patient for a sleep specialist consult. If appropriate, schedule patient for sleep study(s). Initiate treatment if needed. Forward correspondance to my office. Follow-up Instructions Return if symptoms worsen or fail to improve. Referral Information Referral ID Referred By Referred To  
  
 [id number] Ángela WOODS, [address] [place] [address] [facility], [facility] [address] Phone: [phone] Fax: [phone] Visits Status Start Date End Date 1 New Request 12/26/17 12/26/18 If your referral has a status of pending review or denied, additional information will be sent to support the outcome of this decision. Referral ID Referred By Referred To  
 [id number] Maryjane Gregory [facility] [address] Phone: [phone] Fax: [phone] Visits Status Start Date End Date 1 New Request 12/26/17 12/26/18 If your referral has a status of pending review or denied, additional information will be sent to support the outcome of this decision. Patient Instructions To Do: · The podiatrist & the sleep specialist should be calling you in the next 1-2 weeks. If you don't hear from them, you can call them directly. Toenail Fungus: Care Instructions Your Care Instructions A toenail that is infected by a fungus usually turns white or yellow.  As the fungus spreads, the nail turns a darker color and gets thicker, and its edges start to turn ragged and crumble. A bad infection can cause toe pain, and the nail may pull away from the toe. Toenails that are exposed to moisture and warmth a lot are more likely to get infected by a fungus. This can happen from wearing sweaty shoes often and from walking barefoot on shower floors. It is hard to treat toenail fungus, and the infection can return after it has cleared up. But medicines can sometimes get rid of toenail fungus for good. If the infection is very bad, or if it causes a lot of pain, you may need to have the nail removed. Follow-up care is a key part of your treatment and safety. Be sure to make and go to all appointments, and call your doctor if you are having problems. It's also a good idea to know your test results and keep a list of the medicines you take. How can you care for yourself at home? · Take your medicines exactly as prescribed. Call your doctor if you have any problems with your medicine. You will get more details on the specific medicines your doctor prescribes. · If your doctor gave you a cream or liquid to put on your toenail, use it exactly as directed. · Wash your feet often, and wash your hands after touching your feet. · Keep your toenails clean and dry. Dry your feet completely after you bathe and before you put on shoes and socks. · Keep your toenails trimmed. · Change socks often. Wear dry socks that absorb moisture. · Do not go barefoot in public places. · Use a spray or powder that fights fungus on your feet and in your shoes. · Do not pick at the skin around your nails. · Do not use nail polish or fake nails on your toenails. When should you call for help? Call your doctor now or seek immediate medical care if: 
? · You have signs of infection, such as: 
¨ Increased pain, swelling, warmth, or redness. ¨ Red streaks leading from the site. ¨ Pus draining from the site. ¨ A fever. ? · You have new or increased toe pain. ?Watch closely for changes in your health, and be sure to contact your doctor if: 
? · You do not get better as expected. Where can you learn more? Go to http://surendra-campos.info/. Enter D202 in the search box to learn more about \"Toenail Fungus: Care Instructions. \" Current as of: October 13, 2016 Content Version: 11.4 © 2842-4627 [employer]. Care instructions adapted under license by [employer] (which disclaims liability or warranty for this information). If you have questions about a medical condition or this instruction, always ask your healthcare professional. [place] [address] any warranty or liability for your use of this information. Snoring: Care Instructions Your Care Instructions Snoring is a noise that you may make while breathing during sleep. You snore when the flow of air from your mouth or nose to your lungs makes the tissues of your throat vibrate while you sleep. This usually is caused by a blockage or narrowing in your nose, mouth, or throat (airway). Snoring can be soft, loud, raspy, harsh, hoarse, or fluttering. Your bed partner may notice that you sleep with your mouth open and that you are restless while sleeping. If snoring interferes with your or your bed partner's sleep, either or both of you may feel tired during the day. You may be able to help reduce your snoring by making changes in your activities and in the way you sleep. Follow-up care is a key part of your treatment and safety. Be sure to make and go to all appointments, and call your doctor if you are having problems. It's also a good idea to know your test results and keep a list of the medicines you take. How can you care for yourself at home? · Lose weight, if needed. Many people who snore are overweight. Weight loss can help reduce the narrowing of the airway and might reduce or stop snoring. · Limit the use of alcohol and medicines. Drinking a lot of alcohol or taking certain medicines, especially sleeping pills or tranquilizers, before sleep may make snoring worse. · Go to bed at the same time each night, and get plenty of sleep. You may snore more when you have not had enough sleep. · Sleep on your side. Sleeping on your side may stop snoring. Try sewing a pocket in the middle of the back of your pajama top, putting a tennis ball into the pocket, and stitching it closed. This will help keep you from sleeping on your back. · Treat breathing problems. Breathing problems caused by colds or allergies can disturb airflow. This can lead to snoring. · Use a device that helps keep your airway open during sleep. This could be a device that you put in your mouth. Other examples include strips or disks that you use on your nose. · Do not smoke. Smoking can make snoring worse. If you need help quitting, talk to your doctor about stop-smoking programs and medicines. These can increase your chances of quitting for good. · Raise the head of your bed 4 to 6 inches by putting bricks under the legs of the bed. This may prevent your tongue from falling toward the back of the throat, which can make a blocked or narrow airway worse. Putting pillows under your head will not help. When should you call for help? Watch closely for changes in your health, and be sure to contact your doctor if: 
? · You snore, and you feel sleepy during the day. ? · Your sleeping partner or you notice that you gasp, choke, or stop breathing during sleep. ? · You do not get better as expected. Where can you learn more? Go to http://surendra-campos.info/. Enter D283 in the search box to learn more about \"Snoring: Care Instructions. \" Current as of: May 12, 2017 Content Version: 11.4 © 6927-1652 Healthwise[employer].  Care instructions adapted under license by [address] (which disclaims liability or warranty for this information). If you have questions about a medical condition or this instruction, always ask your healthcare professional. [address] any warranty or liability for your use of this information. Introducing [facility]! [employer] introduces Mazoom patient portal. Now you can access parts of your medical record, email your doctor's office, and request medication refills online. 1. In your internet browser, go to [email or web address] 2. Click on the First Time User? Click Here link in the Sign In box. You will see the New Member Sign Up page. 3. Enter your Mazoom Access Code exactly as it appears below. You will not need to use this code after youve completed the sign-up process. If you do not sign up before the expiration date, you must request a new code. · Mazoom Access Code: [email or web address] Expires: 2/26/2018  2:45 PM 
 
4. Enter the last four digits of your Social Security Number (xxxx) and Date of Birth (mm/dd/yyyy) as indicated and click Submit. You will be taken to the next sign-up page. 5. Create a Mazoom ID. This will be your Mazoom login ID and cannot be changed, so think of one that is secure and easy to remember. 6. Create a Mazoom password. You can change your password at any time. 7. Enter your Password Reset Question and Answer. This can be used at a later time if you forget your password. 8. Enter your e-mail address. You will receive e-mail notification when new information is available in [address] 9. Click Sign Up. You can now view and download portions of your medical record. 10. Click the Download Summary menu link to download a portable copy of your medical information. If you have questions, please visit the Frequently Asked Questions section of the Mazoom website.  Remember, Mazoom is NOT to be used for urgent needs. For medical emergencies, dial 911. Now available from your iPhone and Android! Please provide this summary of care documentation to your next provider. Your primary care clinician is listed as Tr Rooney. If you have any questions after today's visit, please call [phone].

## 2017-12-26 NOTE — PATIENT INSTRUCTIONS
To Do: · The podiatrist & the sleep specialist should be calling you in the next 1-2 weeks. If you don't hear from them, you can call them directly. Toenail Fungus: Care Instructions  Your Care Instructions  A toenail that is infected by a fungus usually turns white or yellow. As the fungus spreads, the nail turns a darker color and gets thicker, and its edges start to turn ragged and crumble. A bad infection can cause toe pain, and the nail may pull away from the toe. Toenails that are exposed to moisture and warmth a lot are more likely to get infected by a fungus. This can happen from wearing sweaty shoes often and from walking barefoot on shower floors. It is hard to treat toenail fungus, and the infection can return after it has cleared up. But medicines can sometimes get rid of toenail fungus for good. If the infection is very bad, or if it causes a lot of pain, you may need to have the nail removed. Follow-up care is a key part of your treatment and safety. Be sure to make and go to all appointments, and call your doctor if you are having problems. It's also a good idea to know your test results and keep a list of the medicines you take. How can you care for yourself at home? · Take your medicines exactly as prescribed. Call your doctor if you have any problems with your medicine. You will get more details on the specific medicines your doctor prescribes. · If your doctor gave you a cream or liquid to put on your toenail, use it exactly as directed. · Wash your feet often, and wash your hands after touching your feet. · Keep your toenails clean and dry. Dry your feet completely after you bathe and before you put on shoes and socks. · Keep your toenails trimmed. · Change socks often. Wear dry socks that absorb moisture. · Do not go barefoot in public places. · Use a spray or powder that fights fungus on your feet and in your shoes. · Do not pick at the skin around your nails.   · Do not use nail polish or fake nails on your toenails. When should you call for help? Call your doctor now or seek immediate medical care if:  ? · You have signs of infection, such as:  ¨ Increased pain, swelling, warmth, or redness. ¨ Red streaks leading from the site. ¨ Pus draining from the site. ¨ A fever. ? · You have new or increased toe pain. ? Watch closely for changes in your health, and be sure to contact your doctor if:  ? · You do not get better as expected. Where can you learn more? Go to http://surendra-campos.info/. Enter D202 in the search box to learn more about \"Toenail Fungus: Care Instructions. \"  Current as of: October 13, 2016  Content Version: 11.4  © 2378-5990 [employer]. Care instructions adapted under license by [employer] (which disclaims liability or warranty for this information). If you have questions about a medical condition or this instruction, always ask your healthcare professional. [place] [address] any warranty or liability for your use of this information. Snoring: Care Instructions  Your Care Instructions  Snoring is a noise that you may make while breathing during sleep. You snore when the flow of air from your mouth or nose to your lungs makes the tissues of your throat vibrate while you sleep. This usually is caused by a blockage or narrowing in your nose, mouth, or throat (airway). Snoring can be soft, loud, raspy, harsh, hoarse, or fluttering. Your bed partner may notice that you sleep with your mouth open and that you are restless while sleeping. If snoring interferes with your or your bed partner's sleep, either or both of you may feel tired during the day. You may be able to help reduce your snoring by making changes in your activities and in the way you sleep. Follow-up care is a key part of your treatment and safety.  Be sure to make and go to all appointments, and call your doctor if you are having problems. It's also a good idea to know your test results and keep a list of the medicines you take. How can you care for yourself at home? · Lose weight, if needed. Many people who snore are overweight. Weight loss can help reduce the narrowing of the airway and might reduce or stop snoring. · Limit the use of alcohol and medicines. Drinking a lot of alcohol or taking certain medicines, especially sleeping pills or tranquilizers, before sleep may make snoring worse. · Go to bed at the same time each night, and get plenty of sleep. You may snore more when you have not had enough sleep. · Sleep on your side. Sleeping on your side may stop snoring. Try sewing a pocket in the middle of the back of your pajama top, putting a tennis ball into the pocket, and stitching it closed. This will help keep you from sleeping on your back. · Treat breathing problems. Breathing problems caused by colds or allergies can disturb airflow. This can lead to snoring. · Use a device that helps keep your airway open during sleep. This could be a device that you put in your mouth. Other examples include strips or disks that you use on your nose. · Do not smoke. Smoking can make snoring worse. If you need help quitting, talk to your doctor about stop-smoking programs and medicines. These can increase your chances of quitting for good. · Raise the head of your bed 4 to 6 inches by putting bricks under the legs of the bed. This may prevent your tongue from falling toward the back of the throat, which can make a blocked or narrow airway worse. Putting pillows under your head will not help. When should you call for help? Watch closely for changes in your health, and be sure to contact your doctor if:  ? · You snore, and you feel sleepy during the day. ? · Your sleeping partner or you notice that you gasp, choke, or stop breathing during sleep. ? · You do not get better as expected. Where can you learn more?   Go to http://surendra-campos.info/. Enter R602 in the search box to learn more about \"Snoring: Care Instructions. \"  Current as of: May 12, 2017  Content Version: 11.4  © 2818-9182 Healthwise, [employer]. Care instructions adapted under license by [employer] (which disclaims liability or warranty for this information). If you have questions about a medical condition or this instruction, always ask your healthcare professional. [address] any warranty or liability for your use of this information.

## 2017-12-26 NOTE — PROGRESS NOTES
Gloria Solo is a 41 y.o. male (: 1970) presenting to address:    Chief Complaint   Patient presents with    Cough     follow up. \"doing much better, still have mucus\"       Vitals:    17 0735   Weight: 189 lb 12.8 oz (86.1 kg)   Height: 5' 8\" (1.727 m)   PainSc:   0 - No pain       Hearing/Vision:   No exam data present    Learning Assessment:     Learning Assessment 3/12/2014   PRIMARY LEARNER Patient   HIGHEST LEVEL OF EDUCATION - PRIMARY LEARNER  GRADUATED HIGH SCHOOL OR GED   BARRIERS PRIMARY LEARNER NONE   PRIMARY LANGUAGE ENGLISH   LEARNER PREFERENCE PRIMARY READING   ANSWERED BY self   RELATIONSHIP SELF     Depression Screening:     PHQ over the last two weeks 2017   Little interest or pleasure in doing things Not at all   Feeling down, depressed or hopeless Not at all   Total Score PHQ 2 0     Fall Risk Assessment:   No flowsheet data found. Abuse Screening:     Abuse Screening Questionnaire 3/12/2014   Do you ever feel afraid of your partner? N   Are you in a relationship with someone who physically or mentally threatens you? N   Is it safe for you to go home? Y     Coordination of Care Questionaire:   1. Have you been to the ER, urgent care clinic since your last visit? Hospitalized since your last visit? NO    2. Have you seen or consulted any other health care providers outside of the [address] [place] [address] since your last visit? Include any pap smears or colon screening. NO    Advanced Directive:   1. Do you have an Advanced Directive? NO    2. Would you like information on Advanced Directives?  NO

## 2018-03-06 ENCOUNTER — OFFICE VISIT (OUTPATIENT)
Dept: FAMILY MEDICINE CLINIC | Age: 48
End: 2018-03-06

## 2018-03-06 VITALS
HEIGHT: 68 IN | BODY MASS INDEX: 28.95 KG/M2 | HEART RATE: 91 BPM | OXYGEN SATURATION: 98 % | SYSTOLIC BLOOD PRESSURE: 146 MMHG | RESPIRATION RATE: 20 BRPM | DIASTOLIC BLOOD PRESSURE: 89 MMHG | TEMPERATURE: 98.2 F | WEIGHT: 191 LBS

## 2018-03-06 DIAGNOSIS — R05.9 COUGH: ICD-10-CM

## 2018-03-06 DIAGNOSIS — J40 BRONCHITIS: ICD-10-CM

## 2018-03-06 DIAGNOSIS — H66.90 EAR INFECTION: Primary | ICD-10-CM

## 2018-03-06 DIAGNOSIS — J06.9 UPPER RESPIRATORY TRACT INFECTION, UNSPECIFIED TYPE: ICD-10-CM

## 2018-03-06 RX ORDER — ALBUTEROL SULFATE 90 UG/1
1-2 AEROSOL, METERED RESPIRATORY (INHALATION)
Qty: 1 INHALER | Refills: 2 | Status: SHIPPED | OUTPATIENT
Start: 2018-03-06 | End: 2018-03-29 | Stop reason: SDUPTHER

## 2018-03-06 RX ORDER — AMOXICILLIN AND CLAVULANATE POTASSIUM 875; 125 MG/1; MG/1
1 TABLET, FILM COATED ORAL EVERY 12 HOURS
Qty: 20 TAB | Refills: 0 | Status: SHIPPED | OUTPATIENT
Start: 2018-03-06 | End: 2018-03-14

## 2018-03-06 NOTE — PATIENT INSTRUCTIONS
Ear Infection (Otitis Media): Care Instructions  Your Care Instructions    An ear infection may start with a cold and affect the middle ear (otitis media). It can hurt a lot. Most ear infections clear up on their own in a couple of days. Most often you will not need antibiotics. This is because many ear infections are caused by a virus. Antibiotics don't work against a virus. Regular doses of pain medicines are the best way to reduce your fever and help you feel better. Follow-up care is a key part of your treatment and safety. Be sure to make and go to all appointments, and call your doctor if you are having problems. It's also a good idea to know your test results and keep a list of the medicines you take. How can you care for yourself at home? · Take pain medicines exactly as directed. ¨ If the doctor gave you a prescription medicine for pain, take it as prescribed. ¨ If you are not taking a prescription pain medicine, take an over-the-counter medicine, such as acetaminophen (Tylenol), ibuprofen (Advil, Motrin), or naproxen (Aleve). Read and follow all instructions on the label. ¨ Do not take two or more pain medicines at the same time unless the doctor told you to. Many pain medicines have acetaminophen, which is Tylenol. Too much acetaminophen (Tylenol) can be harmful. · Plan to take a full dose of pain reliever before bedtime. Getting enough sleep will help you get better. · Try a warm, moist washcloth on the ear. It may help relieve pain. · If your doctor prescribed antibiotics, take them as directed. Do not stop taking them just because you feel better. You need to take the full course of antibiotics. When should you call for help? Call your doctor now or seek immediate medical care if:  ? · You have new or increasing ear pain. ? · You have new or increasing pus or blood draining from your ear. ? · You have a fever with a stiff neck or a severe headache. ? Watch closely for changes in your health, and be sure to contact your doctor if:  ? · You have new or worse symptoms. ? · You are not getting better after taking an antibiotic for 2 days. Where can you learn more? Go to http://surendra-campos.info/. Enter T594 in the search box to learn more about \"Ear Infection (Otitis Media): Care Instructions. \"  Current as of: May 12, 2017  Content Version: 11.4  © 1275-1978 Healthwise, [employer]. Care instructions adapted under license by [employer] (which disclaims liability or warranty for this information). If you have questions about a medical condition or this instruction, always ask your healthcare professional. [place] [address] any warranty or liability for your use of this information.

## 2018-03-06 NOTE — PROGRESS NOTES
Wolfgang Peng is a 57 y.o. male (: 1970) presenting to address:ear pain/congestion x2 days    Chief Complaint   Patient presents with    Ear Pain     x2 day    Nasal Congestion       Vitals:    18 1021   BP: 146/89   Pulse: 91   Resp: 20   Temp: 98.2 °F (36.8 °C)   TempSrc: Oral   SpO2: 98%   Weight: 191 lb (86.6 kg)   Height: 5' 8\" (1.727 m)   PainSc:   7   PainLoc: Ear       Hearing/Vision:   No exam data present    Learning Assessment:     Learning Assessment 3/12/2014   PRIMARY LEARNER Patient   HIGHEST LEVEL OF EDUCATION - PRIMARY LEARNER  GRADUATED HIGH SCHOOL OR GED   BARRIERS PRIMARY LEARNER NONE   PRIMARY LANGUAGE ENGLISH   LEARNER PREFERENCE PRIMARY READING   ANSWERED BY self   RELATIONSHIP SELF     Depression Screening:     PHQ over the last two weeks 2017   Little interest or pleasure in doing things Not at all   Feeling down, depressed or hopeless Not at all   Total Score PHQ 2 0     Fall Risk Assessment:   No flowsheet data found. Abuse Screening:     Abuse Screening Questionnaire 3/12/2014   Do you ever feel afraid of your partner? N   Are you in a relationship with someone who physically or mentally threatens you? N   Is it safe for you to go home? Y     Coordination of Care Questionaire:   1. Have you been to the ER, urgent care clinic since your last visit? Hospitalized since your last visit? no    2. Have you seen or consulted any other health care providers outside of the [address] [place] [address] since your last visit? Include any pap smears or colon screening. no    Advanced Directive:   1. Do you have an Advanced Directive? no    2. Would you like information on Advanced Directives? No    Patient declined flu vaccine.

## 2018-03-06 NOTE — PROGRESS NOTES
Subjective:      Kyalie Mendoza is a 57 y.o. male who presents for possible ear infection. Symptoms include right ear pain and tugging at right ear. Onset of symptoms was 2 days ago, gradually worsening since that time. He is drinking plenty of fluids. Past Medical History:   Diagnosis Date    Hypercholesterolemia      Current Outpatient Prescriptions   Medication Sig Dispense Refill    amoxicillin-clavulanate (AUGMENTIN) 875-125 mg per tablet Take 1 Tab by mouth every twelve (12) hours for 10 days. 20 Tab 0    albuterol (PROVENTIL HFA, VENTOLIN HFA, PROAIR HFA) 90 mcg/actuation inhaler Take 1-2 Puffs by inhalation every six (6) hours as needed for Wheezing or Shortness of Breath (cough). 1 Inhaler 2    inhalational spacing device (AEROCHAMBER MV) 1 Each by Does Not Apply route as needed. 1 Device 0    MULTIVITS-MINERALS/FA/LYCOPENE (ONE-A-DAY MEN'S MULTIVITAMIN PO) Take  by mouth.  omega-3 fatty acids-vitamin e (FISH OIL) 1,000 mg cap Take 1 Cap by mouth. No Known Allergies  Social History     Social History    Marital status: SINGLE     Spouse name: N/A    Number of children: N/A    Years of education: N/A     Occupational History    Not on file.      Social History Main Topics    Smoking status: Never Smoker    Smokeless tobacco: Never Used    Alcohol use Yes      Comment: less than 1x/month    Drug use: No    Sexual activity: Yes     Partners: Female     Other Topics Concern    Not on file     Social History Narrative     Review of Systems  A comprehensive review of systems was negative except for: Ears, nose, mouth, throat, and face: positive for earaches, nasal congestion and sore throat    Objective:     Visit Vitals    /89 (BP 1 Location: Left arm, BP Patient Position: Sitting)    Pulse 91    Temp 98.2 °F (36.8 °C) (Oral)    Resp 20    Ht 5' 8\" (1.727 m)    Wt 191 lb (86.6 kg)    SpO2 98%    BMI 29.04 kg/m2     General:  alert, cooperative, no distress, appears stated age   Head:  NCAT w/o lesions or tenderness   Eyes: negative   Right Ear: right TM erythematous, dull, bulging   Left Ear: normal appearance, normal TM   Mouth:  Lips, mucosa, and tongue normal. Teeth and gums normal and abnormal findings: mild oropharyngeal erythema   Neck: supple, symmetrical, trachea midline, mild anterior cervical adenopathy, thyroid: not enlarged, symmetric, no tenderness/mass/nodules, no carotid bruit and no JVD. Lungs: + expiartory wheezes bilateral, + cough   Heart:  regular rate and rhythm, S1, S2 normal, no murmur, click, rub or gallop   Skin: Normal. and no rash or abnormalities        Assessment:     Acute right otitis media   Cough  URI    Plan:     1. Treatment:  Augmentin  2. OTC meds (acetaminophen- doses discussed), fluids, rest, avoid carbonated/ alcoholic, and caffeinated beverages. 3.  Follow up with PCP as needed.

## 2018-03-06 NOTE — MR AVS SNAPSHOT
[facility] 
 
 
 [address] [facility] [phone] [phone] Patient: Jackson Littlejohn MRN: [id number] PSU:[id number] Visit Information Date & Time Provider Department Dept. Phone Encounter #  
 3/6/2018 10:30 AM Aniya Kauffman NP [facility] [phone] [id number] Follow-up Instructions Return if symptoms worsen or fail to improve. Your Appointments 3/14/2018 10:20 AM  
Office Visit with Micheal Callahan MD  
[facility] Hamilton Espinoza) Appt Note: allergies [address] [facility] [id number] [phone]  
  
   
 [address] Upcoming Health Maintenance Date Due DTaP/Tdap/Td series (2 - Td) 2/2/2027 Allergies as of 3/6/2018  Review Complete On: 3/6/2018 By: Collette Casey LPN No Known Allergies Current Immunizations  Never Reviewed Name Date Tdap 2/2/2017 11:34 AM  
  
 Not reviewed this visit You Were Diagnosed With   
  
 Codes Comments Ear infection    -  Primary ICD-10-CM: H66.90 ICD-9-CM: 382.9 Cough     ICD-10-CM: R05 ICD-9-CM: 966. 2 Upper respiratory tract infection, unspecified type     ICD-10-CM: J06.9 ICD-9-CM: 465.9 Bronchitis     ICD-10-CM: J40 ICD-9-CM: 643 Vitals BP Pulse Temp Resp Height(growth percentile) Weight(growth percentile) 146/89 (BP 1 Location: Left arm, BP Patient Position: Sitting) 91 98.2 °F (36.8 °C) (Oral) 20 5' 8\" (1.727 m) 191 lb (86.6 kg) SpO2 BMI Smoking Status 98% 29.04 kg/m2 Never Smoker BMI and BSA Data Body Mass Index Body Surface Area 29.04 kg/m 2 2.04 m 2 Preferred Pharmacy Pharmacy Name Phone [facility] [address] [phone] Your Updated Medication List  
  
   
 This list is accurate as of 3/6/18 10:41 AM.  Always use your most recent med list.  
  
  
  
  
 albuterol 90 mcg/actuation inhaler Commonly known as:  PROVENTIL HFA, VENTOLIN HFA, PROAIR HFA Take 1-2 Puffs by inhalation every six (6) hours as needed for Wheezing or Shortness of Breath (cough). amoxicillin-clavulanate 875-125 mg per tablet Commonly known as:  AUGMENTIN Take 1 Tab by mouth every twelve (12) hours for 10 days. FISH OIL 1,000 mg Cap Generic drug:  omega-3 fatty acids-vitamin e Take 1 Cap by mouth. inhalational spacing device Commonly known as:  AEROCHAMBER MV  
1 Each by Does Not Apply route as needed. ONE-A-DAY MEN'S MULTIVITAMIN PO Take  by mouth. Prescriptions Sent to Pharmacy Refills  
 amoxicillin-clavulanate (AUGMENTIN) 875-125 mg per tablet 0 Sig: Take 1 Tab by mouth every twelve (12) hours for 10 days. Class: Normal  
 Pharmacy: [facility] [address] [address] [place]  Ph #: [phone] Route: Oral  
 albuterol (PROVENTIL HFA, VENTOLIN HFA, PROAIR HFA) 90 mcg/actuation inhaler 2 Sig: Take 1-2 Puffs by inhalation every six (6) hours as needed for Wheezing or Shortness of Breath (cough). Class: Normal  
 Pharmacy: [facility] [address] [place] [address] [facility] Ph #: [phone] Route: Inhalation Follow-up Instructions Return if symptoms worsen or fail to improve. Patient Instructions Ear Infection (Otitis Media): Care Instructions Your Care Instructions An ear infection may start with a cold and affect the middle ear (otitis media). It can hurt a lot. Most ear infections clear up on their own in a couple of days. Most often you will not need antibiotics. This is because many ear infections are caused by a virus.  Antibiotics don't work against a virus. Regular doses of pain medicines are the best way to reduce your fever and help you feel better. Follow-up care is a key part of your treatment and safety. Be sure to make and go to all appointments, and call your doctor if you are having problems. It's also a good idea to know your test results and keep a list of the medicines you take. How can you care for yourself at home? · Take pain medicines exactly as directed. ¨ If the doctor gave you a prescription medicine for pain, take it as prescribed. ¨ If you are not taking a prescription pain medicine, take an over-the-counter medicine, such as acetaminophen (Tylenol), ibuprofen (Advil, Motrin), or naproxen (Aleve). Read and follow all instructions on the label. ¨ Do not take two or more pain medicines at the same time unless the doctor told you to. Many pain medicines have acetaminophen, which is Tylenol. Too much acetaminophen (Tylenol) can be harmful. · Plan to take a full dose of pain reliever before bedtime. Getting enough sleep will help you get better. · Try a warm, moist washcloth on the ear. It may help relieve pain. · If your doctor prescribed antibiotics, take them as directed. Do not stop taking them just because you feel better. You need to take the full course of antibiotics. When should you call for help? Call your doctor now or seek immediate medical care if: 
? · You have new or increasing ear pain. ? · You have new or increasing pus or blood draining from your ear. ? · You have a fever with a stiff neck or a severe headache. ? Watch closely for changes in your health, and be sure to contact your doctor if: 
? · You have new or worse symptoms. ? · You are not getting better after taking an antibiotic for 2 days. Where can you learn more? Go to http://surendra-campos.info/. Enter X942 in the search box to learn more about \"Ear Infection (Otitis Media): Care Instructions. \" Current as of: May 12, 2017 Content Version: 11.4 © 2980-8668 Healthwise, [employer]. Care instructions adapted under license by [employer] (which disclaims liability or warranty for this information). If you have questions about a medical condition or this instruction, always ask your healthcare professional. [address] any warranty or liability for your use of this information. Introducing [facility]! [employer] introduces BizXchange patient portal. Now you can access parts of your medical record, email your doctor's office, and request medication refills online. 1. In your internet browser, go to [email or web address] 2. Click on the First Time User? Click Here link in the Sign In box. You will see the New Member Sign Up page. 3. Enter your BizXchange Access Code exactly as it appears below. You will not need to use this code after youve completed the sign-up process. If you do not sign up before the expiration date, you must request a new code. · BizXchange Access Code: [email or web address] Expires: 6/4/2018 10:41 AM 
 
4. Enter the last four digits of your Social Security Number (xxxx) and Date of Birth (mm/dd/yyyy) as indicated and click Submit. You will be taken to the next sign-up page. 5. Create a BizXchange ID. This will be your BizXchange login ID and cannot be changed, so think of one that is secure and easy to remember. 6. Create a BizXchange password. You can change your password at any time. 7. Enter your Password Reset Question and Answer. This can be used at a later time if you forget your password. 8. Enter your e-mail address. You will receive e-mail notification when new information is available in [address] 9. Click Sign Up. You can now view and download portions of your medical record. 10. Click the Download Summary menu link to download a portable copy of your medical information. If you have questions, please visit the Frequently Asked Questions section of the [facility] website. Remember, [facility] is NOT to be used for urgent needs. For medical emergencies, dial 911. Now available from your iPhone and Android! Please provide this summary of care documentation to your next provider. Your primary care clinician is listed as Sol Garcia. If you have any questions after today's visit, please call [phone].

## 2018-03-14 ENCOUNTER — OFFICE VISIT (OUTPATIENT)
Dept: FAMILY MEDICINE CLINIC | Age: 48
End: 2018-03-14

## 2018-03-14 VITALS
BODY MASS INDEX: 30.28 KG/M2 | OXYGEN SATURATION: 95 % | HEART RATE: 74 BPM | DIASTOLIC BLOOD PRESSURE: 78 MMHG | RESPIRATION RATE: 18 BRPM | HEIGHT: 68 IN | SYSTOLIC BLOOD PRESSURE: 138 MMHG | TEMPERATURE: 96.9 F | WEIGHT: 199.8 LBS

## 2018-03-14 DIAGNOSIS — Z00.00 ROUTINE HEALTH MAINTENANCE: Primary | ICD-10-CM

## 2018-03-14 DIAGNOSIS — J30.9 ALLERGIC RHINITIS, UNSPECIFIED CHRONICITY, UNSPECIFIED SEASONALITY, UNSPECIFIED TRIGGER: ICD-10-CM

## 2018-03-14 DIAGNOSIS — R73.09 ELEVATED HEMOGLOBIN A1C: ICD-10-CM

## 2018-03-14 DIAGNOSIS — E55.9 VITAMIN D INSUFFICIENCY: ICD-10-CM

## 2018-03-14 RX ORDER — FLUTICASONE PROPIONATE 50 MCG
2 SPRAY, SUSPENSION (ML) NASAL DAILY
Qty: 1 BOTTLE | Refills: 2 | Status: SHIPPED | OUTPATIENT
Start: 2018-03-14 | End: 2018-07-30

## 2018-03-14 NOTE — PROGRESS NOTES
Wolfgang Peng is a 57 y.o. male (: 1970) presenting to address:    Chief Complaint   Patient presents with    Allergies     x 1 month       Vitals:    18 1027   BP: 149/79   Pulse: 74   Resp: 18   Temp: 96.9 °F (36.1 °C)   TempSrc: Oral   SpO2: 95%   Weight: 199 lb 12.8 oz (90.6 kg)   Height: 5' 8\" (1.727 m)   PainSc:   0 - No pain       Hearing/Vision:   No exam data present    Learning Assessment:     Learning Assessment 3/12/2014   PRIMARY LEARNER Patient   HIGHEST LEVEL OF EDUCATION - PRIMARY LEARNER  GRADUATED HIGH SCHOOL OR GED   BARRIERS PRIMARY LEARNER NONE   PRIMARY LANGUAGE ENGLISH   LEARNER PREFERENCE PRIMARY READING   ANSWERED BY self   RELATIONSHIP SELF     Depression Screening:     PHQ over the last two weeks 3/14/2018   Little interest or pleasure in doing things Not at all   Feeling down, depressed or hopeless Not at all   Total Score PHQ 2 0     Fall Risk Assessment:     Fall Risk Assessment, last 12 mths 3/14/2018   Able to walk? Yes   Fall in past 12 months? No     Abuse Screening:     Abuse Screening Questionnaire 3/14/2018   Do you ever feel afraid of your partner? N   Are you in a relationship with someone who physically or mentally threatens you? N   Is it safe for you to go home? Y     Coordination of Care Questionaire:   1. Have you been to the ER, urgent care clinic since your last visit? Hospitalized since your last visit? NO    2. Have you seen or consulted any other health care providers outside of the [address] [place] [address] since your last visit? Include any pap smears or colon screening. NO    Advanced Directive:   1. Do you have an Advanced Directive? NO    2. Would you like information on Advanced Directives?  NO

## 2018-03-14 NOTE — PATIENT INSTRUCTIONS
To Do:  · If allergies remain uncontrolled despite taking the OTC medication regularly. .. Consider trying an intranasal steroid spray (Flonase, Nasacort, Aquacort). Loya here is using regularly, each night before you go to bed, and waiting to see the difference in 4-8 days. Notes from your doctor:  · Aim for at least 2000 units of Vitamin D3 on a daily basis  · Try to work on decreasing the overall amount of carbohydrates in your diet. Your bloodwork says \"prediabetes\", but to be quite honest, I don't think you are really heading diabetes. .. But be better about your diet. Dr. [facility]  [address]  [address]  [address]  Main Phone: [phone]         Seasonal Allergies: Care Instructions  Your Care Instructions  Allergies occur when your body's defense system (immune system) overreacts to certain substances. The immune system treats a harmless substance as if it were a harmful germ or virus. Many things can cause this to happen. Examples include pollens, medicine, food, dust, animal dander, and mold. Your allergies are seasonal if you have symptoms just at certain times of the year. In that case, you are probably allergic to pollens from certain trees, grasses, or weeds. Allergies can be mild or severe. Over-the-counter allergy medicine may help with some symptoms. Read and follow all instructions on the label. Managing your allergies is an important part of staying healthy. Your doctor may suggest that you have tests to help find the cause of your allergies. When you know what things trigger your symptoms, you can avoid them. This can prevent allergy symptoms and other health problems. In some cases, immunotherapy might help. For this treatment, you get shots or use pills that have a small amount of certain allergens in them. Your body \"gets used to\" the allergen, so you react less to it over time.  This kind of treatment may help prevent or reduce some allergy symptoms. Follow-up care is a key part of your treatment and safety. Be sure to make and go to all appointments, and call your doctor if you are having problems. It's also a good idea to know your test results and keep a list of the medicines you take. How can you care for yourself at home? · Be safe with medicines. Take your medicines exactly as prescribed. Call your doctor if you think you are having a problem with your medicine. · During your allergy season, keep windows closed. If you need to use air-conditioning, change or clean all filters every month. Take a shower and change your clothes after you have been outside. · Stay inside when pollen counts are high. Vacuum once or twice a week. Use a vacuum  with a HEPA filter or a double-thickness filter. When should you call for help? Give an epinephrine shot if:  ? · You think you are having a severe allergic reaction. ? After giving an epinephrine shot, call 911, even if you feel better. ?Call 911 if:  ? · You have symptoms of a severe allergic reaction. These may include:  ¨ Sudden raised, red areas (hives) all over your body. ¨ Swelling of the throat, mouth, lips, or tongue. ¨ Trouble breathing. ¨ Passing out (losing consciousness). Or you may feel very lightheaded or suddenly feel weak, confused, or restless. ? · You have been given an epinephrine shot, even if you feel better. ?Call your doctor now or seek immediate medical care if:  ? · You have symptoms of an allergic reaction, such as:  ¨ A rash or hives (raised, red areas on the skin). ¨ Itching. ¨ Swelling. ¨ Belly pain, nausea, or vomiting. ? Watch closely for changes in your health, and be sure to contact your doctor if:  ? · You do not get better as expected. Where can you learn more? Go to http://surendra-campos.info/. Enter J912 in the search box to learn more about \"Seasonal Allergies: Care Instructions. \"  Current as of: September 29, 2016  Content Version: 11.4  © 5838-2691 Healthwise, Incorporated. Care instructions adapted under license by [employer] (which disclaims liability or warranty for this information). If you have questions about a medical condition or this instruction, always ask your healthcare professional. [address] any warranty or liability for your use of this information.

## 2018-03-14 NOTE — PROGRESS NOTES
3 [facility]  Primary Care Office Visit - Complete Physical    Jagruti Sams is a 57 y.o. male presenting for annual physical.  : 1970     Assessment/Plan:       ICD-10-CM   1. Routine health maintenance  - up to date on  items     Z00.00   2. Vitamin D insufficiency - new dx  - start OTC VitD3 2000 units daily     E55.9   3. Allergic rhinitis, unspecified chronicity, unspecified seasonality, unspecified trigger - ongoing  - continue OTC antihistamine  - if ongoing sx, may add intranasal steroid     J30.9   4. Elevated hemoglobin A1c - A1c 6.0  - advised re: decreasing carb intake  - will continue to monitor R73.09       Orders Placed This Encounter    fluticasone (FLONASE) 50 mcg/actuation nasal spray     Si Sprays by Both Nostrils route daily. Indications: Allergic Rhinitis     Dispense:  1 Bottle     Refill:  2         Management plan & patient instructions reviewed with Jagruti Sams, who voiced understanding. This document may have been created with the aid of dictation software. Text may contain errors, particularly phonetic errors. Zackary Dang MD  Internal Medicine, Family Medicine & Sports Medicine  3/14/2018, 10:35 AM    Patient Instructions (provided in AVS): To Do:  · If allergies remain uncontrolled despite taking the OTC medication regularly. .. Consider trying an intranasal steroid spray (Flonase, Nasacort, Aquacort). Loya here is using regularly, each night before you go to bed, and waiting to see the difference in 4-8 days. Notes from your doctor:  · Aim for at least 2000 units of Vitamin D3 on a daily basis  · Try to work on decreasing the overall amount of carbohydrates in your diet. Your bloodwork says \"prediabetes\", but to be quite honest, I don't think you are really heading diabetes. .. But be better about your diet.     [facility]  [address] [facility]  [address]  [address]  Main Phone: [phone]     Seasonal Allergies: Care Instructions      History:   Jackson Littlejohn is a 57 y.o. male presenting for an annual physical.    Overall doing okay. Has recovered from his ear infection. Has had an appt with sleep medicine, but needs to return for follow-up. Has not yet had appt with podiatry. Admits his diet is not the best, eating spaghetti about 4x/week. Past Medical History:   Diagnosis Date    Hypercholesterolemia      Past Surgical History:   Procedure Laterality Date    HX APPENDECTOMY  age 27      reports that he has never smoked. He has never used smokeless tobacco. He reports that he drinks alcohol. He reports that he does not use illicit drugs. Social History     Social History Narrative     History   Smoking Status    Never Smoker   Smokeless Tobacco    Never Used     Family History   Problem Relation Age of Onset    Prostate Cancer Father      prostate     No Known Allergies    Problem List:      Patient Active Problem List    Diagnosis    Vitamin D insufficiency    Erectile dysfunction    Routine health maintenance    Onychomycosis of toenail    Family history of prostate cancer       Medications:     Current Outpatient Prescriptions   Medication Sig    fluticasone (FLONASE) 50 mcg/actuation nasal spray 2 Sprays by Both Nostrils route daily. Indications: Allergic Rhinitis    albuterol (PROVENTIL HFA, VENTOLIN HFA, PROAIR HFA) 90 mcg/actuation inhaler Take 1-2 Puffs by inhalation every six (6) hours as needed for Wheezing or Shortness of Breath (cough).  inhalational spacing device (AEROCHAMBER MV) 1 Each by Does Not Apply route as needed.  MULTIVITS-MINERALS/FA/LYCOPENE (ONE-A-DAY MEN'S MULTIVITAMIN PO) Take  by mouth.  omega-3 fatty acids-vitamin e (FISH OIL) 1,000 mg cap Take 1 Cap by mouth. No current facility-administered medications for this visit. Review of Systems:     Review of Systems   Constitutional: Negative for chills and fever.    HENT: Negative for ear pain and sore throat. Respiratory: Negative for cough and shortness of breath. Cardiovascular: Negative for chest pain and palpitations. Gastrointestinal: Negative for abdominal pain. Genitourinary: Negative for dysuria. Musculoskeletal: Negative for myalgias. Skin: Negative for rash. Neurological: Negative for speech change, focal weakness and headaches. Endo/Heme/Allergies: Does not bruise/bleed easily. Psychiatric/Behavioral: Negative for depression. The patient is not nervous/anxious and does not have insomnia. Physical Assessment:   VS:    Visit Vitals    /78 (BP 1 Location: Left arm, BP Patient Position: Sitting)  Comment (BP 1 Location): manual    Pulse 74    Temp 96.9 °F (36.1 °C) (Oral)    Resp 18    Ht 5' 8\" (1.727 m)    Wt 199 lb 12.8 oz (90.6 kg)    SpO2 95%    BMI 30.38 kg/m2       Physical Exam   Constitutional: He is oriented to person, place, and time. He appears well-developed and well-nourished. No distress. + significantly muscular (falsely elevated BMI)   HENT:   Head: Normocephalic and atraumatic. Right Ear: Tympanic membrane, external ear and ear canal normal.   Left Ear: Tympanic membrane, external ear and ear canal normal.   Mouth/Throat: Oropharynx is clear and moist.   Eyes: EOM are normal. Pupils are equal, round, and reactive to light. Neck: Normal range of motion. Neck supple. Cardiovascular: Normal rate, regular rhythm and normal heart sounds. No murmur heard. Pulmonary/Chest: Effort normal and breath sounds normal. No respiratory distress. He has no wheezes. Abdominal: Soft. Bowel sounds are normal. There is no tenderness. There is no rebound. Musculoskeletal: Normal range of motion. Neurological: He is alert and oriented to person, place, and time. No cranial nerve deficit. Skin: Skin is warm and dry. He is not diaphoretic. Psychiatric: He has a normal mood and affect.  His behavior is normal. Judgment and thought content normal.   Nursing note reviewed. Recent Labs & Imaging:       Results for orders placed or performed in visit on 01/25/17   LIPID PANEL   Result Value Ref Range    Triglyceride 157 (H) 40 - 149 mg/dL    HDL Cholesterol 27 (L) 40 - 59 mg/dL    Cholesterol, total 157 110 - 200 mg/dL    LDL, calculated 98 50 - 99 mg/dL    VLDL, calculated 31 (H) 8 - 30 mg/dL   METABOLIC PANEL, COMPREHENSIVE   Result Value Ref Range    Glucose 92 65 - 99 mg/dL    BUN 16 6 - 22 mg/dL    Creatinine 0.9 0.5 - 1.2 mg/dL    Sodium 143 133 - 145 mmol/L    Potassium 4.2 3.5 - 5.5 mmol/L    Chloride 102 98 - 110 mmol/L    CO2 22 20 - 32 mmol/L    AST (SGOT) 30 10 - 37 U/L    ALT (SGPT) 34 5 - 40 U/L    Alk. phosphatase 83 25 - 115 U/L    Bilirubin, total 0.2 0.2 - 1.2 mg/dL    Calcium 9.2 8.4 - 10.4 mg/dL    Protein, total 7.8 6.4 - 8.3 g/dL    Albumin 4.6 3.5 - 5.0 g/dL    A-G Ratio 1.4 1.1 - 2.6 ratio    Globulin 3.2 2.0 - 4.0 g/dL    Anion gap 19.0 mmol/L    GFRAA >60.0 >60.0    GFRNA >60.0 >60.0   T4, FREE   Result Value Ref Range    T4, Free 1.1 0.9 - 1.8 ng/dL   VITAMIN D, 25 HYDROXY   Result Value Ref Range    VITAMIN D, 25-HYDROXY 29.1 (L) 32.0 - 100.0 ng/mL   PSA SCREENING (SCREENING)   Result Value Ref Range    Prostate Specific Ag 1.300 <=2.000 ng/mL   TSH, 3RD GENERATION   Result Value Ref Range    TSH 1.89 0.27 - 4.20 mcU/mL   CBC WITH AUTOMATED DIFF   Result Value Ref Range    WBC 4.3 4.0 - 11.0 K/uL    RBC 4.96 3.80 - 5.80 M/uL    HGB 14.2 13.2 - 17.3 g/dL    HCT 42.0 36.6 - 51.9 %    MCV 85 80 - 95 fL    MCH 29 26 - 34 pg    MCHC 34 32 - 36 g/dL    RDW 13.4 10.0 - 16.0 %    PLATELET 641 916 - 031 K/uL    MPV 10.3 6.0 - 10.8 fL    NEUTROPHILS 47 40 - 75 %    Lymphocytes 45 27 - 45 %    MONOCYTES 6 3 - 9 %    EOSINOPHILS 1 0 - 6 %    BASOPHILS 1 0 - 2 %    ABS. NEUTROPHILS 2.0 1.8 - 7.7 K/uL    ABSOLUTE LYMPHOCYTE COUNT 1.9 1.0 - 4.8 K/uL    ABS. MONOCYTES 0.3 0.1 - 0.9 K/uL    ABS.  EOSINOPHILS 0.1 0.0 - 0.5 K/uL    ABS.  BASOPHILS 0.0 0.0 - 0.2 K/uL   HEMOGLOBIN A1C W/O EAGSTIMATION   Result Value Ref Range    Hemoglobin A1c 6.0 (H) 4.8 - 5.9 %    AVG  (H) 91 - 123 mg/dL   PSA, TOTAL &  FREE   Result Value Ref Range    Prostate Specific Ag 1.0 0.0 - 4.0 ng/mL    PSA, Free 0.14 N/A ng/mL    PSA, % Free 14.0 %

## 2018-03-14 NOTE — MR AVS SNAPSHOT
[address] [facility] [id number] [phone] Patient: Christina Ha MRN: [id number] AXT:0[birth year]20/[birth year]141 Visit Information Date & Time Provider Department Dept. Phone Encounter #  
 3/14/2018 10:20 AM De Shoemaker MD [address] [place] [address] [phone] [id number] Follow-up Instructions Return if symptoms worsen or fail to improve, for 30min complete physical.  
  
Upcoming Health Maintenance Date Due DTaP/Tdap/Td series (2 - Td) 2/2/2027 Allergies as of 3/14/2018  Review Complete On: 3/14/2018 By: Missy Gilbert LPN No Known Allergies Current Immunizations  Never Reviewed Name Date Tdap 2/2/2017 11:34 AM  
  
 Not reviewed this visit You Were Diagnosed With   
  
 Codes Comments Routine health maintenance    -  Primary ICD-10-CM: Z00.00 ICD-9-CM: V70.0 Vitamin D insufficiency     ICD-10-CM: E55.9 ICD-9-CM: 268.9 Allergic rhinitis, unspecified chronicity, unspecified seasonality, unspecified trigger     ICD-10-CM: J30.9 ICD-9-CM: 477.9 Vitals BP Pulse Temp Resp Height(growth percentile) Weight(growth percentile) 138/78 (BP 1 Location: Left arm, BP Patient Position: Sitting) 74 96.9 °F (36.1 °C) (Oral) 18 5' 8\" (1.727 m) 199 lb 12.8 oz (90.6 kg) SpO2 BMI Smoking Status 95% 30.38 kg/m2 Never Smoker Vitals History BMI and BSA Data Body Mass Index Body Surface Area  
 30.38 kg/m 2 2.08 m 2 Preferred Pharmacy Pharmacy Name Phone [phone] [phone] Your Updated Medication List  
  
   
This list is accurate as of 3/14/18 10:48 AM.  Always use your most recent med list.  
  
  
  
  
 albuterol 90 mcg/actuation inhaler Commonly known as:  PROVENTIL HFA, VENTOLIN HFA, PROAIR HFA  
 Take 1-2 Puffs by inhalation every six (6) hours as needed for Wheezing or Shortness of Breath (cough). FISH OIL 1,000 mg Cap Generic drug:  omega-3 fatty acids-vitamin e Take 1 Cap by mouth. fluticasone 50 mcg/actuation nasal spray Commonly known as:  Essie Medin 2 Sprays by Both Nostrils route daily. Indications: Allergic Rhinitis  
  
 inhalational spacing device Commonly known as:  AEROCHAMBER MV  
1 Each by Does Not Apply route as needed. ONE-A-DAY MEN'S MULTIVITAMIN PO Take  by mouth. Prescriptions Printed Refills  
 fluticasone (FLONASE) 50 mcg/actuation nasal spray 2 Si Sprays by Both Nostrils route daily. Indications: Allergic Rhinitis Class: Print Route: Both Nostrils Follow-up Instructions Return if symptoms worsen or fail to improve, for 30min complete physical.  
  
  
Patient Instructions To Do: 
· If allergies remain uncontrolled despite taking the OTC medication regularly. .. Consider trying an intranasal steroid spray (Flonase, Nasacort, Aquacort). Loya here is using regularly, each night before you go to bed, and waiting to see the difference in 4-8 days. Notes from your doctor: · Aim for at least 2000 units of Vitamin D3 on a daily basis · Try to work on decreasing the overall amount of carbohydrates in your diet. Your bloodwork says \"prediabetes\", but to be quite honest, I don't think you are really heading diabetes. .. But be better about your diet. Dr. Mauro [facility] [address] [facility] [address] [facility] , [place] [address] Main Phone: [phone] Seasonal Allergies: Care Instructions Your Care Instructions Allergies occur when your body's defense system (immune system) overreacts to certain substances. The immune system treats a harmless substance as if it were a harmful germ or virus. Many things can cause this to happen. Examples include pollens, medicine, food, dust, animal dander, and mold. Your allergies are seasonal if you have symptoms just at certain times of the year. In that case, you are probably allergic to pollens from certain trees, grasses, or weeds. Allergies can be mild or severe. Over-the-counter allergy medicine may help with some symptoms. Read and follow all instructions on the label. Managing your allergies is an important part of staying healthy. Your doctor may suggest that you have tests to help find the cause of your allergies. When you know what things trigger your symptoms, you can avoid them. This can prevent allergy symptoms and other health problems. In some cases, immunotherapy might help. For this treatment, you get shots or use pills that have a small amount of certain allergens in them. Your body \"gets used to\" the allergen, so you react less to it over time. This kind of treatment may help prevent or reduce some allergy symptoms. Follow-up care is a key part of your treatment and safety. Be sure to make and go to all appointments, and call your doctor if you are having problems. It's also a good idea to know your test results and keep a list of the medicines you take. How can you care for yourself at home? · Be safe with medicines. Take your medicines exactly as prescribed. Call your doctor if you think you are having a problem with your medicine. · During your allergy season, keep windows closed. If you need to use air-conditioning, change or clean all filters every month. Take a shower and change your clothes after you have been outside. · Stay inside when pollen counts are high. Vacuum once or twice a week. Use a vacuum  with a HEPA filter or a double-thickness filter. When should you call for help? Give an epinephrine shot if: 
? · You think you are having a severe allergic reaction. ? After giving an epinephrine shot, call 911, even if you feel better. ?Call 911 if: ? · You have symptoms of a severe allergic reaction. These may include: 
¨ Sudden raised, red areas (hives) all over your body. ¨ Swelling of the throat, mouth, lips, or tongue. ¨ Trouble breathing. ¨ Passing out (losing consciousness). Or you may feel very lightheaded or suddenly feel weak, confused, or restless. ? · You have been given an epinephrine shot, even if you feel better. ?Call your doctor now or seek immediate medical care if: 
? · You have symptoms of an allergic reaction, such as: ¨ A rash or hives (raised, red areas on the skin). ¨ Itching. ¨ Swelling. ¨ Belly pain, nausea, or vomiting. ? Watch closely for changes in your health, and be sure to contact your doctor if: 
? · You do not get better as expected. Where can you learn more? Go to http://surendra-campos.info/. Enter J912 in the search box to learn more about \"Seasonal Allergies: Care Instructions. \" Current as of: September 29, 2016 Content Version: 11.4 © 7191-9826 [employer]. Care instructions adapted under license by [employer] (which disclaims liability or warranty for this information). If you have questions about a medical condition or this instruction, always ask your healthcare professional. [address] any warranty or liability for your use of this information. Introducing \[facility] [place] [facility]\"" & HEALTH SERVICES! [facility] introduces [employer] patient portal. Now you can access parts of your medical record, email your doctor's office, and request medication refills online. 1. In your internet browser, go to [email or web address] 2. Click on the First Time User? Click Here link in the Sign In box. You will see the New Member Sign Up page. 3. Enter your [employer] Access Code exactly as it appears below. You will not need to use this code after youve completed the sign-up process.  If you do not sign up before the expiration date, you must request a new code. · [employer] Access Code: [email or web address] Expires: 6/4/2018 11:41 AM 
 
4. Enter the last four digits of your Social Security Number (xxxx) and Date of Birth (mm/dd/yyyy) as indicated and click Submit. You will be taken to the next sign-up page. 5. Create a [employer] ID. This will be your [employer] login ID and cannot be changed, so think of one that is secure and easy to remember. 6. Create a [employer] password. You can change your password at any time. 7. Enter your Password Reset Question and Answer. This can be used at a later time if you forget your password. 8. Enter your e-mail address. You will receive e-mail notification when new information is available in [address] 9. Click Sign Up. You can now view and download portions of your medical record. 10. Click the Download Summary menu link to download a portable copy of your medical information. If you have questions, please visit the Frequently Asked Questions section of the [employer] website. Remember, [employer] is NOT to be used for urgent needs. For medical emergencies, dial 911. Now available from your iPhone and Android! Please provide this summary of care documentation to your next provider. Your primary care clinician is listed as Tiki Lawrence. If you have any questions after today's visit, please call [phone].

## 2018-03-29 ENCOUNTER — OFFICE VISIT (OUTPATIENT)
Dept: FAMILY MEDICINE CLINIC | Age: 48
End: 2018-03-29

## 2018-03-29 VITALS
SYSTOLIC BLOOD PRESSURE: 132 MMHG | TEMPERATURE: 97.8 F | HEART RATE: 77 BPM | DIASTOLIC BLOOD PRESSURE: 82 MMHG | WEIGHT: 194.2 LBS | OXYGEN SATURATION: 97 % | HEIGHT: 68 IN | RESPIRATION RATE: 18 BRPM | BODY MASS INDEX: 29.43 KG/M2

## 2018-03-29 DIAGNOSIS — R06.00 DYSPNEA, UNSPECIFIED TYPE: Primary | ICD-10-CM

## 2018-03-29 DIAGNOSIS — J45.20 MILD INTERMITTENT REACTIVE AIRWAY DISEASE WITHOUT COMPLICATION: ICD-10-CM

## 2018-03-29 DIAGNOSIS — J30.1 SEASONAL ALLERGIC RHINITIS DUE TO POLLEN, UNSPECIFIED CHRONICITY: ICD-10-CM

## 2018-03-29 RX ORDER — ALBUTEROL SULFATE 90 UG/1
1-2 AEROSOL, METERED RESPIRATORY (INHALATION)
Qty: 1 INHALER | Refills: 2 | Status: SHIPPED | OUTPATIENT
Start: 2018-03-29 | End: 2019-05-07 | Stop reason: SDUPTHER

## 2018-03-29 NOTE — PROGRESS NOTES
Federico Newman is a 57 y.o. male (: 1970) presenting to address:    Chief Complaint   Patient presents with    Shortness of Breath       Vitals:    18 1414   BP: 132/82   Pulse: 77   Resp: 18   Temp: 97.8 °F (36.6 °C)   TempSrc: Oral   SpO2: 97%   Weight: 194 lb 3.2 oz (88.1 kg)   Height: 5' 8\" (1.727 m)   PainSc:   0 - No pain       Hearing/Vision:   No exam data present    Learning Assessment:     Learning Assessment 3/12/2014   PRIMARY LEARNER Patient   HIGHEST LEVEL OF EDUCATION - PRIMARY LEARNER  GRADUATED HIGH SCHOOL OR GED   BARRIERS PRIMARY LEARNER NONE   PRIMARY LANGUAGE ENGLISH   LEARNER PREFERENCE PRIMARY READING   ANSWERED BY self   RELATIONSHIP SELF     Depression Screening:     PHQ over the last two weeks 3/14/2018   Little interest or pleasure in doing things Not at all   Feeling down, depressed or hopeless Not at all   Total Score PHQ 2 0     Fall Risk Assessment:     Fall Risk Assessment, last 12 mths 3/14/2018   Able to walk? Yes   Fall in past 12 months? No     Abuse Screening:     Abuse Screening Questionnaire 3/14/2018   Do you ever feel afraid of your partner? N   Are you in a relationship with someone who physically or mentally threatens you? N   Is it safe for you to go home? Y     Coordination of Care Questionaire:   1. Have you been to the ER, urgent care clinic since your last visit? Hospitalized since your last visit? NO    2. Have you seen or consulted any other health care providers outside of the [address] [place] [address] since your last visit? Include any pap smears or colon screening. NO    Advanced Directive:   1. Do you have an Advanced Directive? NO    2. Would you like information on Advanced Directives?  YES

## 2018-03-29 NOTE — PROGRESS NOTES
3 [facility]  Primary Care Office Visit - Problem-Oriented    : 1970   Charlee Moe is a 57 y.o. male presenting for  Chief Complaint   Patient presents with    Shortness of Breath       Assessment/Plan:       ICD-10-CM ICD-9-CM   1. Dyspnea, unspecified type - initial encounter, likely 2/2  R06.00 786.09   2. Mild intermittent reactive airway disease without complication - possibly related to  J45.20 493.90   3. Seasonal allergic rhinitis due to pollen, unspecified chronicity - ongoing J30.1 477.0     Continue flonase. Reassurance given re: normal EKG and CXR. Trial of albuterol PRN. Spent 25min face-to-face, >50% spent on counseling & patient education. Orders Placed This Encounter    XR CHEST PA LAT     Standing Status:   Future     Number of Occurrences:   1     Standing Expiration Date:   3/30/2019     Order Specific Question:   Reason for Exam     Answer:   shortness of breath     Order Specific Question:   Is Patient Allergic to Contrast Dye? Answer:   Unknown     Order Specific Question:   Which facility to perform procedure? Answer:   [facility]    AMB POC EKG ROUTINE W/ 12 LEADS, INTER & REP     Order Specific Question:   Reason for Exam:     Answer:   shortness of breath    albuterol (PROVENTIL HFA, VENTOLIN HFA, PROAIR HFA) 90 mcg/actuation inhaler     Sig: Take 1-2 Puffs by inhalation every six (6) hours as needed for Wheezing or Shortness of Breath (cough). Dispense:  1 Inhaler     Refill:  2         This document may have been created with the aid of dictation software. Text may contain errors, particularly phonetic errors. Reviewed management plan & instructions with patient, who voiced understanding. Rosa Ellison MD  Internal Medicine, Family Medicine & Sports Medicine  3/29/2018, 2:39 PM    Patient Instructions (provided in AVS):     · I think this might be a touch of asthma, that may be related to your seasonal allergies.   · Try the albuterol inhaler, particularly when you feel like this, and also try before workout, just to see if things improve or not. Asthma in Adults: Care Instructions      History:   Benitez Rouse is a 57 y.o. male presenting to address:  Chief Complaint   Patient presents with    Shortness of Breath       Last few days  sensation of shortness of breath with occasional wheeze. Never while exercising (lifting or cardio). Feels as though it is \"hard to talk because I get short of breath\". No cough. Dyspnea doesn't wake him at night. Occasionally has it in the morning. History of post nasal drip. Is taking his flonase nasal spray regularly. No history of asthma. Denies any chest pain, palpitations. Otherwise is doing well. \"I just don't want to be trying to lift like 300# and feel like I can't breathe, you know? So right now I'm sticking with 940# until I'm certain, although my goal is 315#\"      Past Medical History:   Diagnosis Date    Hypercholesterolemia      Past Surgical History:   Procedure Laterality Date    HX APPENDECTOMY  age 27      reports that he has never smoked. He has never used smokeless tobacco. He reports that he drinks alcohol. He reports that he does not use illicit drugs. Social History     Social History Narrative     History   Smoking Status    Never Smoker   Smokeless Tobacco    Never Used     Family History   Problem Relation Age of Onset    Prostate Cancer Father      prostate     No Known Allergies    Problem List:      Patient Active Problem List    Diagnosis    Vitamin D insufficiency    Elevated hemoglobin A1c    Erectile dysfunction    Routine health maintenance    Onychomycosis of toenail    Family history of prostate cancer       Medications:     Current Outpatient Prescriptions   Medication Sig    fluticasone (FLONASE) 50 mcg/actuation nasal spray 2 Sprays by Both Nostrils route daily. Indications:  Allergic Rhinitis    albuterol (PROVENTIL HFA, VENTOLIN HFA, PROAIR HFA) 90 mcg/actuation inhaler Take 1-2 Puffs by inhalation every six (6) hours as needed for Wheezing or Shortness of Breath (cough).  inhalational spacing device (AEROCHAMBER MV) 1 Each by Does Not Apply route as needed.  MULTIVITS-MINERALS/FA/LYCOPENE (ONE-A-DAY MEN'S MULTIVITAMIN PO) Take  by mouth.  omega-3 fatty acids-vitamin e (FISH OIL) 1,000 mg cap Take 1 Cap by mouth. No current facility-administered medications for this visit. Review of Systems:     Review of Systems   Constitutional: Negative for chills and fever. HENT: Positive for congestion. Respiratory: Positive for shortness of breath and wheezing. Negative for cough, hemoptysis and sputum production. Cardiovascular: Negative for chest pain, palpitations, orthopnea, claudication, leg swelling and PND. Gastrointestinal: Negative for abdominal pain, nausea and vomiting. Neurological: Negative for dizziness, tremors and headaches. Psychiatric/Behavioral: The patient is not nervous/anxious and does not have insomnia. Physical Assessment:   VS:    Vitals:    03/29/18 1414 03/29/18 1454   BP: 132/82    Pulse: 77    Resp: 18    Temp: 97.8 °F (36.6 °C)    TempSrc: Oral    SpO2: 97%    Weight: 194 lb 3.2 oz (88.1 kg)    Height: 5' 8\" (1.727 m)    PF:  400 L/min   PainSc:   0 - No pain        Physical Exam   Constitutional: He is oriented to person, place, and time. He appears well-developed and well-nourished. No distress. HENT:   Head: Normocephalic and atraumatic. Right Ear: External ear normal.   Left Ear: External ear normal.   Mouth/Throat: Oropharynx is clear and moist.   Eyes: EOM are normal. Pupils are equal, round, and reactive to light. Neck: Normal range of motion. Neck supple. Cardiovascular: Normal rate, regular rhythm and normal heart sounds. No murmur heard. Pulmonary/Chest: Effort normal and breath sounds normal. No respiratory distress. He has no wheezes. Abdominal: Soft. Bowel sounds are normal. There is no tenderness. There is no rebound. Musculoskeletal: Normal range of motion. Neurological: He is alert and oriented to person, place, and time. No cranial nerve deficit. Skin: Skin is warm and dry. He is not diaphoretic. Psychiatric: He has a normal mood and affect. His behavior is normal. Judgment and thought content normal.   Nursing note reviewed. Recent Labs & Imaging:     XR Results (most recent):    Results from Appointment encounter on 03/29/18   XR CHEST PA LAT   Narrative CHEST PA AND LATERAL    Indication:  Shortness of breath. Comparison: None. Findings: The lungs appear clear. No pneumothorax. Cardiac silhouette and  pulmonary vascularity are within normal limits. Costophrenic angles are sharp. Impression IMPRESSION:    No acute cardiopulmonary disease.         EKG: SR, normal axis, no ST-T changes

## 2018-03-29 NOTE — PATIENT INSTRUCTIONS
· I think this might be a touch of asthma, that may be related to your seasonal allergies. · Try the albuterol inhaler, particularly when you feel like this, and also try before workout, just to see if things improve or not. Asthma in Adults: Care Instructions  Your Care Instructions    During an asthma attack, your airways swell and narrow as a reaction to certain things (triggers). This makes it hard to breathe. You may be able to prevent asthma attacks if you avoid the things that set off your asthma symptoms. Keeping your asthma under control and treating symptoms before they get bad can help you avoid severe attacks. If you can control your asthma, you may be able to do all of your normal daily activities. You may also avoid asthma attacks and trips to the hospital.  Follow-up care is a key part of your treatment and safety. Be sure to make and go to all appointments, and call your doctor if you are having problems. It's also a good idea to know your test results and keep a list of the medicines you take. How can you care for yourself at home? · Follow your asthma action plan so you can manage your symptoms at home. An asthma action plan will help you prevent and control airway reactions and will tell you what to do during an asthma attack. If you do not have an asthma action plan, work with your doctor to build one. · Take your asthma medicine exactly as prescribed. Medicine plays an important role in controlling asthma. Talk to your doctor right away if you have any questions about what to take and how to take it. ¨ Use your quick-relief medicine when you have symptoms of an attack. Quick-relief medicine often is an albuterol inhaler. Some people need to use quick-relief medicine before they exercise. ¨ Take your controller medicine every day, not just when you have symptoms. Controller medicine is usually an inhaled corticosteroid. The goal is to prevent problems before they occur.  Do not use your controller medicine to try to treat an attack that has already started. It does not work fast enough to help. ¨ If your doctor prescribed corticosteroid pills to use during an attack, take them as directed. They may take hours to work, but they may shorten the attack and help you breathe better. ¨ Keep your quick-relief medicine with you at all times. · Talk to your doctor before using other medicines. Some medicines, such as aspirin, can cause asthma attacks in some people. · Check yourself for asthma symptoms to know which step to follow in your action plan. Watch for things like being short of breath, having chest tightness, coughing, and wheezing. Also notice if symptoms wake you up at night or if you get tired quickly when you exercise. · If you have a peak flow meter, use it to check how well you are breathing. This can help you predict when an asthma attack is going to occur. Then you can take medicine to prevent the asthma attack or make it less severe. · See your doctor regularly. These visits will help you learn more about asthma and what you can do to control it. Your doctor will monitor your treatment to make sure the medicine is helping you. · Keep track of your asthma attacks and your treatment. After you have had an attack, write down what triggered it, what helped end it, and any concerns you have about your asthma action plan. Take your diary when you see your doctor. You can then review your asthma action plan and decide if it is working. · Do not smoke or allow others to smoke around you. Avoid smoky places. Smoking makes asthma worse. If you need help quitting, talk to your doctor about stop-smoking programs and medicines. These can increase your chances of quitting for good. · Learn what triggers an asthma attack for you, and avoid the triggers when you can. Common triggers include colds, smoke, air pollution, dust, pollen, mold, pets, cockroaches, stress, and cold air.   · Avoid colds and the flu. Get a pneumococcal vaccine shot. If you have had one before, ask your doctor whether you need a second dose. Get a flu vaccine every fall. If you must be around people with colds or the flu, wash your hands often. When should you call for help? Call 911 anytime you think you may need emergency care. For example, call if:  ? · You have severe trouble breathing. ?Call your doctor now or seek immediate medical care if:  ? · Your symptoms do not get better after you have followed your asthma action plan. ? · You cough up yellow, dark brown, or bloody mucus (sputum). ? Watch closely for changes in your health, and be sure to contact your doctor if:  ? · Your coughing and wheezing get worse. ? · You need to use quick-relief medicine on more than 2 days a week (unless it is just for exercise). ? · You need help figuring out what is triggering your asthma attacks. Where can you learn more? Go to http://surendra-campos.info/. Enter P597 in the search box to learn more about \"Asthma in Adults: Care Instructions. \"  Current as of: May 12, 2017  Content Version: 11.4  © 8193-3350 Healthwise, [employer]. Care instructions adapted under license by [employer] (which disclaims liability or warranty for this information). If you have questions about a medical condition or this instruction, always ask your healthcare professional. [place] [address] any warranty or liability for your use of this information.

## 2018-03-29 NOTE — MR AVS SNAPSHOT
Kierra Dudley 
 
 
 [address] [facility] [address] [phone] Patient: Abbey Fothergill MRN: [id number] Visit Information Date & Time Provider Department Dept. Phone Encounter #  
 3/29/2018  2:10 PM Lucio Blanco [facility] [place] [phone] [id number] Upcoming Health Maintenance Date Due DTaP/Tdap/Td series (2 - Td) 2/2/2027 Allergies as of 3/29/2018  Review Complete On: 3/29/2018 By: Alexandra Gilliland MD  
 No Known Allergies Current Immunizations  Never Reviewed Name Date Tdap 2/2/2017 11:34 AM  
  
 Not reviewed this visit You Were Diagnosed With   
  
 Codes Comments Dyspnea, unspecified type    -  Primary ICD-10-CM: R06.00 
ICD-9-CM: 786.09 Bronchitis     ICD-10-CM: J40 ICD-9-CM: 249 Vitals BP Pulse Temp Resp Height(growth percentile) Weight(growth percentile) 132/82 (BP 1 Location: Right arm, BP Patient Position: Sitting) 77 97.8 °F (36.6 °C) (Oral) 18 5' 8\" (1.727 m) 194 lb 3.2 oz (88.1 kg) SpO2 PF BMI Smoking Status 97% 400 L/min 29.53 kg/m2 Never Smoker Vitals History BMI and BSA Data Body Mass Index Body Surface Area  
 29.53 kg/m 2 2.06 m 2 Preferred Pharmacy Pharmacy Name Phone [address] [facility] [address] [phone] Your Updated Medication List  
  
   
This list is accurate as of 3/29/18  3:14 PM.  Always use your most recent med list.  
  
  
  
  
 albuterol 90 mcg/actuation inhaler Commonly known as:  PROVENTIL HFA, VENTOLIN HFA, PROAIR HFA Take 1-2 Puffs by inhalation every six (6) hours as needed for Wheezing or Shortness of Breath (cough). FISH OIL 1,000 mg Cap Generic drug:  omega-3 fatty acids-vitamin e Take 1 Cap by mouth. fluticasone 50 mcg/actuation nasal spray Commonly known as:  Suha Samish 2 Sprays by Both Nostrils route daily. Indications: Allergic Rhinitis  
  
 inhalational spacing device Commonly known as:  AEROCHAMBER MV  
1 Each by Does Not Apply route as needed. ONE-A-DAY MEN'S MULTIVITAMIN PO Take  by mouth. Prescriptions Sent to Pharmacy Refills  
 albuterol (PROVENTIL HFA, VENTOLIN HFA, PROAIR HFA) 90 mcg/actuation inhaler 2 Sig: Take 1-2 Puffs by inhalation every six (6) hours as needed for Wheezing or Shortness of Breath (cough). Class: Normal  
 Pharmacy: [id number] Ph #: [phone] Route: Inhalation We Performed the Following AMB POC EKG ROUTINE W/ 12 LEADS, INTER & REP [52467 CPT(R)] To-Do List   
 03/29/2018 Imaging:  XR CHEST PA LAT Patient Instructions · I think this might be a touch of asthma, that may be related to your seasonal allergies. · Try the albuterol inhaler, particularly when you feel like this, and also try before workout, just to see if things improve or not. Asthma in Adults: Care Instructions Your Care Instructions During an asthma attack, your airways swell and narrow as a reaction to certain things (triggers). This makes it hard to breathe. You may be able to prevent asthma attacks if you avoid the things that set off your asthma symptoms. Keeping your asthma under control and treating symptoms before they get bad can help you avoid severe attacks. If you can control your asthma, you may be able to do all of your normal daily activities. You may also avoid asthma attacks and trips to the hospital. 
Follow-up care is a key part of your treatment and safety. Be sure to make and go to all appointments, and call your doctor if you are having problems. It's also a good idea to know your test results and keep a list of the medicines you take. How can you care for yourself at home? · Follow your asthma action plan so you can manage your symptoms at home. An asthma action plan will help you prevent and control airway reactions and will tell you what to do during an asthma attack. If you do not have an asthma action plan, work with your doctor to build one. · Take your asthma medicine exactly as prescribed. Medicine plays an important role in controlling asthma. Talk to your doctor right away if you have any questions about what to take and how to take it. ¨ Use your quick-relief medicine when you have symptoms of an attack. Quick-relief medicine often is an albuterol inhaler. Some people need to use quick-relief medicine before they exercise. ¨ Take your controller medicine every day, not just when you have symptoms. Controller medicine is usually an inhaled corticosteroid. The goal is to prevent problems before they occur. Do not use your controller medicine to try to treat an attack that has already started. It does not work fast enough to help. ¨ If your doctor prescribed corticosteroid pills to use during an attack, take them as directed. They may take hours to work, but they may shorten the attack and help you breathe better. ¨ Keep your quick-relief medicine with you at all times. · Talk to your doctor before using other medicines. Some medicines, such as aspirin, can cause asthma attacks in some people. · Check yourself for asthma symptoms to know which step to follow in your action plan. Watch for things like being short of breath, having chest tightness, coughing, and wheezing. Also notice if symptoms wake you up at night or if you get tired quickly when you exercise. · If you have a peak flow meter, use it to check how well you are breathing. This can help you predict when an asthma attack is going to occur. Then you can take medicine to prevent the asthma attack or make it less severe. · See your doctor regularly. These visits will help you learn more about asthma and what you can do to control it. Your doctor will monitor your treatment to make sure the medicine is helping you. · Keep track of your asthma attacks and your treatment. After you have had an attack, write down what triggered it, what helped end it, and any concerns you have about your asthma action plan. Take your diary when you see your doctor. You can then review your asthma action plan and decide if it is working. · Do not smoke or allow others to smoke around you. Avoid smoky places. Smoking makes asthma worse. If you need help quitting, talk to your doctor about stop-smoking programs and medicines. These can increase your chances of quitting for good. · Learn what triggers an asthma attack for you, and avoid the triggers when you can. Common triggers include colds, smoke, air pollution, dust, pollen, mold, pets, cockroaches, stress, and cold air. · Avoid colds and the flu. Get a pneumococcal vaccine shot. If you have had one before, ask your doctor whether you need a second dose. Get a flu vaccine every fall. If you must be around people with colds or the flu, wash your hands often. When should you call for help? Call 911 anytime you think you may need emergency care. For example, call if: 
? · You have severe trouble breathing. ?Call your doctor now or seek immediate medical care if: 
? · Your symptoms do not get better after you have followed your asthma action plan. ? · You cough up yellow, dark brown, or bloody mucus (sputum). ? Watch closely for changes in your health, and be sure to contact your doctor if: 
? · Your coughing and wheezing get worse. ? · You need to use quick-relief medicine on more than 2 days a week (unless it is just for exercise). ? · You need help figuring out what is triggering your asthma attacks. Where can you learn more? Go to http://surendra-campos.info/. Enter P597 in the search box to learn more about \"Asthma in Adults: Care Instructions. \" Current as of: May 12, 2017 Content Version: 11.4 © 5894-8067 Healthwise, [employer]. Care instructions adapted under license by [employer] (which disclaims liability or warranty for this information). If you have questions about a medical condition or this instruction, always ask your healthcare professional. [address] any warranty or liability for your use of this information. Introducing [address]! [facility] introduces cloudswave patient portal. Now you can access parts of your medical record, email your doctor's office, and request medication refills online. 1. In your internet browser, go to [email or web address] 2. Click on the First Time User? Click Here link in the Sign In box. You will see the New Member Sign Up page. 3. Enter your cloudswave Access Code exactly as it appears below. You will not need to use this code after youve completed the sign-up process. If you do not sign up before the expiration date, you must request a new code. · cloudswave Access Code: [email or web address] Expires: 6/4/2018 11:41 AM 
 
4. Enter the last four digits of your Social Security Number (xxxx) and Date of Birth (mm/dd/yyyy) as indicated and click Submit. You will be taken to the next sign-up page. 5. Create a cloudswave ID. This will be your cloudswave login ID and cannot be changed, so think of one that is secure and easy to remember. 6. Create a cloudswave password. You can change your password at any time. 7. Enter your Password Reset Question and Answer. This can be used at a later time if you forget your password. 8. Enter your e-mail address. You will receive e-mail notification when new information is available in [address] 9. Click Sign Up. You can now view and download portions of your medical record. 10. Click the Download Summary menu link to download a portable copy of your medical information. If you have questions, please visit the Frequently Asked Questions section of the [employer] website. Remember, [employer] is NOT to be used for urgent needs. For medical emergencies, dial 911. Now available from your iPhone and Android! Please provide this summary of care documentation to your next provider. Your primary care clinician is listed as Dejan Guerra. If you have any questions after today's visit, please call [phone].

## 2018-07-18 ENCOUNTER — OFFICE VISIT (OUTPATIENT)
Dept: FAMILY MEDICINE CLINIC | Age: 48
End: 2018-07-18

## 2018-07-18 ENCOUNTER — TELEPHONE (OUTPATIENT)
Dept: FAMILY MEDICINE CLINIC | Age: 48
End: 2018-07-18

## 2018-07-18 VITALS
SYSTOLIC BLOOD PRESSURE: 147 MMHG | HEIGHT: 68 IN | OXYGEN SATURATION: 98 % | BODY MASS INDEX: 29.55 KG/M2 | WEIGHT: 195 LBS | TEMPERATURE: 98.8 F | HEART RATE: 80 BPM | RESPIRATION RATE: 18 BRPM | DIASTOLIC BLOOD PRESSURE: 86 MMHG

## 2018-07-18 DIAGNOSIS — J20.9 ACUTE BRONCHITIS, UNSPECIFIED ORGANISM: Primary | ICD-10-CM

## 2018-07-18 DIAGNOSIS — R06.02 SHORTNESS OF BREATH: ICD-10-CM

## 2018-07-18 DIAGNOSIS — R06.2 WHEEZING: ICD-10-CM

## 2018-07-18 RX ORDER — PREDNISONE 20 MG/1
TABLET ORAL
Qty: 11 TAB | Refills: 0 | Status: SHIPPED | OUTPATIENT
Start: 2018-07-18 | End: 2018-07-30

## 2018-07-18 RX ORDER — IPRATROPIUM BROMIDE AND ALBUTEROL SULFATE 2.5; .5 MG/3ML; MG/3ML
3 SOLUTION RESPIRATORY (INHALATION)
Qty: 1 NEBULE | Refills: 0
Start: 2018-07-18 | End: 2018-07-18

## 2018-07-18 NOTE — PROGRESS NOTES
Daya Camargo     Chief Complaint   Patient presents with    Breathing Problem    Cough     Vitals:    07/18/18 1514   BP: 147/86   Pulse: 80   Resp: 18   Temp: 98.8 °F (37.1 °C)   TempSrc: Oral   SpO2: 95%   Weight: 195 lb (88.5 kg)   Height: 5' 7.99\" (1.727 m)   PainSc:   0 - No pain         HPi:  Patient is here to follow-up after the emergency room visit he is being to the emergency room on 12 July with shortness of breath diagnosis acute bronchitis chest x-ray was normal.  Patient was given Advair discus Zithromax, and now like 6 days later patient is not feeling better he short of breath and coughing. He has no chest pain no fever occasional headaches no dizziness no loss of consciousness no nausea no vomiting no diarrhea. He was using albuterol inhaler prior to this episode due to wheezing and shortness of breath, but he is not known asthmatic and not known COPD. Past Medical History:   Diagnosis Date    Hypercholesterolemia      Past Surgical History:   Procedure Laterality Date    HX APPENDECTOMY  age 27     Social History   Substance Use Topics    Smoking status: Never Smoker    Smokeless tobacco: Never Used    Alcohol use Yes      Comment: less than 1x/month       Family History   Problem Relation Age of Onset    Prostate Cancer Father      prostate       Review of Systems   Constitutional: Negative for chills, fever, malaise/fatigue and weight loss. HENT: Negative for congestion, ear discharge, ear pain, hearing loss, nosebleeds, sinus pain and sore throat. Eyes: Negative for blurred vision, double vision, photophobia and discharge. Respiratory: Positive for cough, sputum production, shortness of breath and wheezing. Cardiovascular: Negative for chest pain, palpitations, claudication and leg swelling. Gastrointestinal: Negative for abdominal pain, constipation, diarrhea, nausea and vomiting. Genitourinary: Negative for dysuria, frequency and urgency.    Musculoskeletal: Negative for back pain, joint pain, myalgias and neck pain. Skin: Negative for itching and rash. Neurological: Negative for dizziness, tingling, sensory change, speech change, focal weakness, weakness and headaches. Psychiatric/Behavioral: Negative for depression, hallucinations, substance abuse and suicidal ideas. The patient is not nervous/anxious and does not have insomnia. Physical Exam   Constitutional: He is oriented to person, place, and time. He appears well-developed and well-nourished. No distress. HENT:   Head: Normocephalic and atraumatic. Mouth/Throat: No oropharyngeal exudate. Eyes: Conjunctivae are normal. Pupils are equal, round, and reactive to light. Right eye exhibits no discharge. Left eye exhibits no discharge. No scleral icterus. Neck: Normal range of motion. Neck supple. No thyromegaly present. Cardiovascular: Normal rate, regular rhythm and normal heart sounds. Pulmonary/Chest: He has wheezes. He has no rales. Patient is coughing with deep inspiration has bilateral wheezing   Abdominal: Soft. Bowel sounds are normal. He exhibits no distension and no mass. There is no tenderness. There is no rebound. Musculoskeletal: Normal range of motion. He exhibits no edema, tenderness or deformity. Lymphadenopathy:     He has no cervical adenopathy. Neurological: He is alert and oriented to person, place, and time. No cranial nerve deficit. Coordination normal.   Skin: Skin is warm and dry. No rash noted. He is not diaphoretic. No erythema. Psychiatric: He has a normal mood and affect. Judgment and thought content normal.        Assessment and plan     1. Acute bronchitis, unspecified organism    - XR CHEST PA LAT; Future      Chest x-ray looks normal.    Patient was given DuoNeb nebulizer he felt better after the nebulizer and his oxygen saturation went up from 95-98%. We will add prednisone oral to his regiment.   Advised to follow-up in 3 days with his primary care provider. And if his symptoms are getting worse he should follow-up sooner or go to emergency room he agrees with the plan and verbalized understanding. 2. Shortness of breath    - XR CHEST PA LAT; Future    3. Wheezing      Current Outpatient Prescriptions   Medication Sig Dispense Refill    albuterol (PROVENTIL HFA, VENTOLIN HFA, PROAIR HFA) 90 mcg/actuation inhaler Take 1-2 Puffs by inhalation every six (6) hours as needed for Wheezing or Shortness of Breath (cough). 1 Inhaler 2    inhalational spacing device (AEROCHAMBER MV) 1 Each by Does Not Apply route as needed. 1 Device 0    MULTIVITS-MINERALS/FA/LYCOPENE (ONE-A-DAY MEN'S MULTIVITAMIN PO) Take  by mouth.  fluticasone (FLONASE) 50 mcg/actuation nasal spray 2 Sprays by Both Nostrils route daily. Indications: Allergic Rhinitis 1 Bottle 2    omega-3 fatty acids-vitamin e (FISH OIL) 1,000 mg cap Take 1 Cap by mouth. Patient Active Problem List    Diagnosis Date Noted    Vitamin D insufficiency 03/14/2018    Elevated hemoglobin A1c 03/14/2018    Erectile dysfunction 03/12/2014    Routine health maintenance 03/12/2014    Onychomycosis of toenail 03/12/2014    Family history of prostate cancer 03/12/2014     Results for orders placed or performed in visit on 01/25/17   LIPID PANEL   Result Value Ref Range    Triglyceride 157 (H) 40 - 149 mg/dL    HDL Cholesterol 27 (L) 40 - 59 mg/dL    Cholesterol, total 157 110 - 200 mg/dL    LDL, calculated 98 50 - 99 mg/dL    VLDL, calculated 31 (H) 8 - 30 mg/dL   METABOLIC PANEL, COMPREHENSIVE   Result Value Ref Range    Glucose 92 65 - 99 mg/dL    BUN 16 6 - 22 mg/dL    Creatinine 0.9 0.5 - 1.2 mg/dL    Sodium 143 133 - 145 mmol/L    Potassium 4.2 3.5 - 5.5 mmol/L    Chloride 102 98 - 110 mmol/L    CO2 22 20 - 32 mmol/L    AST (SGOT) 30 10 - 37 U/L    ALT (SGPT) 34 5 - 40 U/L    Alk.  phosphatase 83 25 - 115 U/L    Bilirubin, total 0.2 0.2 - 1.2 mg/dL    Calcium 9.2 8.4 - 10.4 mg/dL    Protein, total 7.8 6.4 - 8.3 g/dL    Albumin 4.6 3.5 - 5.0 g/dL    A-G Ratio 1.4 1.1 - 2.6 ratio    Globulin 3.2 2.0 - 4.0 g/dL    Anion gap 19.0 mmol/L    GFRAA >60.0 >60.0    GFRNA >60.0 >60.0   T4, FREE   Result Value Ref Range    T4, Free 1.1 0.9 - 1.8 ng/dL   VITAMIN D, 25 HYDROXY   Result Value Ref Range    VITAMIN D, 25-HYDROXY 29.1 (L) 32.0 - 100.0 ng/mL   PSA SCREENING (SCREENING)   Result Value Ref Range    Prostate Specific Ag 1.300 <=2.000 ng/mL   TSH, 3RD GENERATION   Result Value Ref Range    TSH 1.89 0.27 - 4.20 mcU/mL   CBC WITH AUTOMATED DIFF   Result Value Ref Range    WBC 4.3 4.0 - 11.0 K/uL    RBC 4.96 3.80 - 5.80 M/uL    HGB 14.2 13.2 - 17.3 g/dL    HCT 42.0 36.6 - 51.9 %    MCV 85 80 - 95 fL    MCH 29 26 - 34 pg    MCHC 34 32 - 36 g/dL    RDW 13.4 10.0 - 16.0 %    PLATELET 763 123 - 804 K/uL    MPV 10.3 6.0 - 10.8 fL    NEUTROPHILS 47 40 - 75 %    Lymphocytes 45 27 - 45 %    MONOCYTES 6 3 - 9 %    EOSINOPHILS 1 0 - 6 %    BASOPHILS 1 0 - 2 %    ABS. NEUTROPHILS 2.0 1.8 - 7.7 K/uL    ABSOLUTE LYMPHOCYTE COUNT 1.9 1.0 - 4.8 K/uL    ABS. MONOCYTES 0.3 0.1 - 0.9 K/uL    ABS. EOSINOPHILS 0.1 0.0 - 0.5 K/uL    ABS. BASOPHILS 0.0 0.0 - 0.2 K/uL   HEMOGLOBIN A1C W/O EAGSTIMATION   Result Value Ref Range    Hemoglobin A1c 6.0 (H) 4.8 - 5.9 %    AVG  (H) 91 - 123 mg/dL   PSA, TOTAL &  FREE   Result Value Ref Range    Prostate Specific Ag 1.0 0.0 - 4.0 ng/mL    PSA, Free 0.14 N/A ng/mL    PSA, % Free 14.0 %     No visits with results within 3 Month(s) from this visit.   Latest known visit with results is:    Orders Only on 01/25/2017   Component Date Value Ref Range Status    Triglyceride 01/25/2017 157* 40 - 149 mg/dL Final    HDL Cholesterol 01/25/2017 27* 40 - 59 mg/dL Final    Cholesterol, total 01/25/2017 157  110 - 200 mg/dL Final    LDL, calculated 01/25/2017 98  50 - 99 mg/dL Final    VLDL, calculated 01/25/2017 31* 8 - 30 mg/dL Final    Comment: Test includes cholesterol, HDL cholesterol, triglycerides and LDL. Cholesterol Recommended NCEP guidelines in mg/dL:  Less than 200      Desirable  200 - 239          Borderline High  Greater than or  = to 240   High      Glucose 01/25/2017 92  65 - 99 mg/dL Final    BUN 01/25/2017 16  6 - 22 mg/dL Final    Creatinine 01/25/2017 0.9  0.5 - 1.2 mg/dL Final    Sodium 01/25/2017 143  133 - 145 mmol/L Final    Potassium 01/25/2017 4.2  3.5 - 5.5 mmol/L Final    Chloride 01/25/2017 102  98 - 110 mmol/L Final    CO2 01/25/2017 22  20 - 32 mmol/L Final    AST (SGOT) 01/25/2017 30  10 - 37 U/L Final    ALT (SGPT) 01/25/2017 34  5 - 40 U/L Final    Alk. phosphatase 01/25/2017 83  25 - 115 U/L Final    Bilirubin, total 01/25/2017 0.2  0.2 - 1.2 mg/dL Final    Calcium 01/25/2017 9.2  8.4 - 10.4 mg/dL Final    Protein, total 01/25/2017 7.8  6.4 - 8.3 g/dL Final    Albumin 01/25/2017 4.6  3.5 - 5.0 g/dL Final    A-G Ratio 01/25/2017 1.4  1.1 - 2.6 ratio Final    Globulin 01/25/2017 3.2  2.0 - 4.0 g/dL Final    Anion gap 01/25/2017 19.0  mmol/L Final    Comment: Test includes Albumin, Alkaline Phosphatase, ALT, AST, BUN, Calcium, CO2,  Chloride, Creatinine, Glucose, Potassium, Sodium, Total Bilirubin and Total  Protein. Estimated GFR results are reported in mL/min/1.73 sq.m. by the MDRD equation. This eGFR is validated for stable chronic renal failure patients. This   equation  is unreliable in acute illness or patients with normal renal function.       GFRAA 01/25/2017 >60.0  >60.0 Final    GFRNA 01/25/2017 >60.0  >60.0 Final    T4, Free 01/25/2017 1.1  0.9 - 1.8 ng/dL Final    VITAMIN D, 25-HYDROXY 01/25/2017 29.1* 32.0 - 100.0 ng/mL Final    Prostate Specific Ag 01/25/2017 1.300  <=2.000 ng/mL Final    TSH 01/25/2017 1.89  0.27 - 4.20 mcU/mL Final    WBC 01/25/2017 4.3  4.0 - 11.0 K/uL Final    RBC 01/25/2017 4.96  3.80 - 5.80 M/uL Final    HGB 01/25/2017 14.2  13.2 - 17.3 g/dL Final    HCT 01/25/2017 42.0  36.6 - 51.9 % Final    MCV 01/25/2017 85  80 - 95 fL Final    MCH 01/25/2017 29  26 - 34 pg Final    MCHC 01/25/2017 34  32 - 36 g/dL Final    RDW 01/25/2017 13.4  10.0 - 16.0 % Final    PLATELET 96/41/0252 637  140 - 440 K/uL Final    MPV 01/25/2017 10.3  6.0 - 10.8 fL Final    NEUTROPHILS 01/25/2017 47  40 - 75 % Final    Lymphocytes 01/25/2017 45  27 - 45 % Final    MONOCYTES 01/25/2017 6  3 - 9 % Final    EOSINOPHILS 01/25/2017 1  0 - 6 % Final    BASOPHILS 01/25/2017 1  0 - 2 % Final    ABS. NEUTROPHILS 01/25/2017 2.0  1.8 - 7.7 K/uL Final    ABSOLUTE LYMPHOCYTE COUNT 01/25/2017 1.9  1.0 - 4.8 K/uL Final    ABS. MONOCYTES 01/25/2017 0.3  0.1 - 0.9 K/uL Final    ABS. EOSINOPHILS 01/25/2017 0.1  0.0 - 0.5 K/uL Final    ABS. BASOPHILS 01/25/2017 0.0  0.0 - 0.2 K/uL Final    Hemoglobin A1c 01/25/2017 6.0* 4.8 - 5.9 % Final    AVG GLU 01/25/2017 125* 91 - 123 mg/dL Final    Prostate Specific Ag 01/25/2017 1.0  0.0 - 4.0 ng/mL Final    Comment: Roche ECLIA methodology. According to the American Urological Association, Serum PSA should  decrease and remain at undetectable levels after radical  prostatectomy. The AUA defines biochemical recurrence as an initial  PSA value 0.2 ng/mL or greater followed by a subsequent confirmatory  PSA value 0.2 ng/mL or greater. Values obtained with different assay methods or kits cannot be used  interchangeably. Results cannot be interpreted as absolute evidence  of the presence or absence of malignant disease.  PSA, Free 01/25/2017 0.14  N/A ng/mL Final    Roche ECLIA methodology.  PSA, % Free 01/25/2017 14.0  % Final    Comment: The table below lists the probability of prostate cancer for  men with non-suspicious AMADA results and total PSA between  4 and 10 ng/mL, by patient age Emmie Chambers, [address] [place] [address],  [phone]).                     % Free PSA       50-64 yr        65-75 yr                    0.00-10.00%        56%             55%                   10.01-15.00%        24% 35%                   15.01-20.00%        17%             23%                   20.01-25.00%        10%             20%                        >25.00%         5%              9%  Please note:  Chase et al did not make specific                recommendations regarding the use of                percent free PSA for any other population                of men. Performed at:  [address]  [id number]  : Fay Byers MD, Phone:  [phone]            Follow-up Disposition:  Return for with PCP.

## 2018-07-18 NOTE — TELEPHONE ENCOUNTER
Mina Thakur, nurse from [facility]. called reporting the patient is having sob with talking related to bronchitis. There are no availabilities on his pcp schedule today. Per Mina Thakur, patient has been evaluated and is in no immediate distress.  Patient has been put on the schedule to see Dr. Franck Adame at 3:30pm.

## 2018-07-18 NOTE — PROGRESS NOTES
Curry Tejada is a 57 y.o. male presents in office to be seen for cough and SOB    Health Maintenance Due   Topic Date Due    Pneumococcal 19-64 Medium Risk (1 of 1 - PPSV23) 01/30/1991       1. Have you been to the ER, urgent care clinic since your last visit? Hospitalized since your last visit? yes last Thursday at [facility] for bronchitis    2. Have you seen or consulted any other health care providers outside of the [facility] since your last visit? Include any pap smears or colon screening.  no

## 2018-07-30 ENCOUNTER — OFFICE VISIT (OUTPATIENT)
Dept: FAMILY MEDICINE CLINIC | Age: 48
End: 2018-07-30

## 2018-07-30 VITALS
RESPIRATION RATE: 18 BRPM | OXYGEN SATURATION: 98 % | BODY MASS INDEX: 30.04 KG/M2 | HEIGHT: 68 IN | TEMPERATURE: 98.5 F | DIASTOLIC BLOOD PRESSURE: 70 MMHG | SYSTOLIC BLOOD PRESSURE: 128 MMHG | HEART RATE: 88 BPM | WEIGHT: 198.2 LBS

## 2018-07-30 DIAGNOSIS — J45.21 MILD INTERMITTENT ASTHMA WITH ACUTE EXACERBATION: Primary | ICD-10-CM

## 2018-07-30 RX ORDER — FLUTICASONE PROPIONATE 50 MCG
2 SPRAY, SUSPENSION (ML) NASAL DAILY
Qty: 1 BOTTLE | Refills: 2 | Status: SHIPPED | OUTPATIENT
Start: 2018-07-30 | End: 2019-05-07 | Stop reason: SDUPTHER

## 2018-07-30 NOTE — MR AVS SNAPSHOT
[address] [place] [address] Soniya Carter [address] [facility] [address] 
[phone] Patient: Sharyle Addison MRN: [id number] QIE:[id number] Visit Information Date & Time Provider Department Dept. Phone Encounter #  
 7/30/2018 11:10 AM Christine Giron MD [address] [place], [place] [phone] [id number] Follow-up Instructions Return in about 6 weeks (around 9/10/2018) for 20min follow-up. Upcoming Health Maintenance Date Due Pneumococcal 19-64 Medium Risk (1 of 1 - PPSV23) 1/30/1991 Influenza Age 5 to Adult 8/1/2018 DTaP/Tdap/Td series (2 - Td) 2/2/2027 Allergies as of 7/30/2018  Review Complete On: 7/30/2018 By: Christine Giron MD  
 No Known Allergies Current Immunizations  Never Reviewed Name Date Tdap 2/2/2017 11:34 AM  
  
 Not reviewed this visit Vitals BP Pulse Temp Resp Height(growth percentile) Weight(growth percentile) 128/70 (BP 1 Location: Right arm, BP Patient Position: Sitting) 88 98.5 °F (36.9 °C) (Oral) 18 5' 7.99\" (1.727 m) 198 lb 3.2 oz (89.9 kg) SpO2 PF BMI Smoking Status 98% 680 L/min 30.15 kg/m2 Never Smoker Vitals History BMI and BSA Data Body Mass Index Body Surface Area  
 30.15 kg/m 2 2.08 m 2 Preferred Pharmacy Pharmacy Name Phone [address], [address] [place] [address] [phone] Your Updated Medication List  
  
   
This list is accurate as of 7/30/18 11:50 AM.  Always use your most recent med list.  
  
  
  
  
 albuterol 90 mcg/actuation inhaler Commonly known as:  PROVENTIL HFA, VENTOLIN HFA, PROAIR HFA Take 1-2 Puffs by inhalation every six (6) hours as needed for Wheezing or Shortness of Breath (cough). FISH OIL 1,000 mg Cap Generic drug:  omega-3 fatty acids-vitamin e Take 1 Cap by mouth. fluticasone 50 mcg/actuation nasal spray Commonly known as:  Laverne Serene 2 Sprays by Both Nostrils route daily. Indications: Allergic Rhinitis ONE-A-DAY MEN'S MULTIVITAMIN PO Take  by mouth. Prescriptions Sent to Pharmacy Refills  
 fluticasone (FLONASE) 50 mcg/actuation nasal spray 2 Si Sprays by Both Nostrils route daily. Indications: Allergic Rhinitis Class: Normal  
 Pharmacy: [facility] Goff [address] #: [phone] Route: Both Nostrils Follow-up Instructions Return in about 6 weeks (around 9/10/2018) for 20min follow-up. Patient Instructions To Do: 
· Restart flonase 2 sprays each nostril daily. · Use your albuterol (Ventolin) with the spacer (plastic tube) as needed Notes from your doctor: · Goal = flonase + albuterol as needed = really well controlled asthma.  IF... You don't stay well controlled like this, we will send you to [facility]! [employer] introduces EndoShape patient portal. Now you can access parts of your medical record, email your doctor's office, and request medication refills online. 1. In your internet browser, go to [email or web address] 2. Click on the First Time User? Click Here link in the Sign In box. You will see the New Member Sign Up page. 3. Enter your EndoShape Access Code exactly as it appears below. You will not need to use this code after youve completed the sign-up process. If you do not sign up before the expiration date, you must request a new code. · EndoShape Access Code: [email or web address] Expires: 10/16/2018  3:40 PM 
 
4. Enter the last four digits of your Social Security Number (xxxx) and Date of Birth (mm/dd/yyyy) as indicated and click Submit. You will be taken to the next sign-up page. 5. Create a EndoShape ID.  This will be your EndoShape login ID and cannot be changed, so think of one that is secure and easy to remember. 6. Create a Al[employer]Sens password. You can change your password at any time. 7. Enter your Password Reset Question and Answer. This can be used at a later time if you forget your password. 8. Enter your e-mail address. You will receive e-mail notification when new information is available in [address] 9. Click Sign Up. You can now view and download portions of your medical record. 10. Click the Download Summary menu link to download a portable copy of your medical information. If you have questions, please visit the Frequently Asked Questions section of the Al[employer] website. Remember, Al[employer]Sens is NOT to be used for urgent needs. For medical emergencies, dial 911. Now available from your iPhone and Android! Please provide this summary of care documentation to your next provider. Your primary care clinician is listed as Jorge Merino. If you have any questions after today's visit, please call [phone].

## 2018-07-30 NOTE — PROGRESS NOTES
Fanny Mendez is a 57 y.o. male (: 1970) presenting to address: Chief Complaint Patient presents with  Shortness of Breath  Cough  
  productive at times w/ yellow and clear phlegm Vitals:  
 18 1121 BP: 140/82 Pulse: 88 Resp: 18 Temp: 98.5 °F (36.9 °C) TempSrc: Oral  
SpO2: 98% Weight: 198 lb 3.2 oz (89.9 kg) Height: 5' 7.99\" (1.727 m) PainSc:   0 - No pain Hearing/Vision: No exam data present Learning Assessment:  
 
Learning Assessment 3/12/2014 PRIMARY LEARNER Patient HIGHEST LEVEL OF EDUCATION - PRIMARY LEARNER  GRADUATED HIGH SCHOOL OR GED  
BARRIERS PRIMARY LEARNER NONE PRIMARY LANGUAGE ENGLISH  
LEARNER PREFERENCE PRIMARY READING  
ANSWERED BY self RELATIONSHIP SELF Depression Screening: PHQ over the last two weeks 2018 Little interest or pleasure in doing things Not at all Feeling down, depressed, irritable, or hopeless Not at all Total Score PHQ 2 0 Fall Risk Assessment:  
 
Fall Risk Assessment, last 12 mths 3/14/2018 Able to walk? Yes Fall in past 12 months? No  
 
Abuse Screening:  
 
Abuse Screening Questionnaire 3/14/2018 Do you ever feel afraid of your partner? Carlos Rivers Are you in a relationship with someone who physically or mentally threatens you? Carlos Rivers Is it safe for you to go home? Kriss [facility] Coordination of Care Questionaire: 1. Have you been to the ER, urgent care clinic since your last visit? Hospitalized since your last visit? Tomas Nettles 2. General 18 SOB 2. Have you seen or consulted any other health care providers outside of the [address] [place] [address] since your last visit? Include any pap smears or colon screening. NO Advanced Directive: 1. Do you have an Advanced Directive? NO 
 
2. Would you like information on Advanced Directives?  NO

## 2018-07-30 NOTE — PROGRESS NOTES
[address] [facility] Office Visit - Problem-Oriented : 1970 Rajan Boyer is a 57 y.o. male presenting for Chief Complaint Patient presents with  Shortness of Breath  Cough  
  productive at times w/ yellow and clear phlegm Assessment/Plan: 1. Mild intermittent asthma with acute exacerbation PF much improved today (best ever, in fact). Exacerbation resolved. Likely 2/2 discontinuing flonase, causing uncontrolled allergic component. Restart flonase. Continue albuterol + spacer PRN. Offered pulm referral however Rajan Boyer prefers to refrain from specialist (due to cost) for now. Advised to continue flonase indefinitely for controller. F/u 6wks. - fluticasone (FLONASE) 50 mcg/actuation nasal spray; 2 Sprays by Both Nostrils route daily. Indications: Allergic Rhinitis  Dispense: 1 Bottle; Refill: 2 2. BMI 30 
2/2 to muscular build, not due to obesity. Orders & Diagnoses associated with This Encounter: ICD-10-CM ICD-9-CM 1. Mild intermittent asthma with acute exacerbation J45.21 493.92  
2. BMI 30.0-30.9,adult Z68.30 V85.30 Orders Placed This Encounter  fluticasone (FLONASE) 50 mcg/actuation nasal spray Si Sprays by Both Nostrils route daily. Indications: Allergic Rhinitis Dispense:  1 Bottle Refill:  2 This document may have been created with the aid of dictation software. Text may contain errors, particularly phonetic errors. Reviewed management plan & instructions with patient, who voiced understanding. Holly Anderson MD 
Internal Medicine, Family Medicine & Sports Medicine 2018, 11:37 AM 
 
Patient Instructions To Do: 
· Restart flonase 2 sprays each nostril daily. · Use your albuterol (Ventolin) with the spacer (plastic tube) as needed Notes from your doctor: · Goal = flonase + albuterol as needed = really well controlled asthma.  IF...  You don't stay well controlled like this, we will send you to pulmonology History:  
Manny Kam is a 57 y.o. male presenting to address: Chief Complaint Patient presents with  Shortness of Breath  Cough  
  productive at times w/ yellow and clear phlegm Prior to 1 month ago: 
Doing quite well. Albuterol did help with exercise. Was using flonase regularly. Did discontinue the flonase \"at some point. I think I just ran out or whatever. \" 
 
 
ED visit on 7/12 for cough and congestion x 2-3 days. given IV solumedrol & duoneb. D/c'd with Rx for zpak + advair 100/50 BID. Seen @ [address] on 7/18. PO steroid added to aforementioned regimen. Currently still having cough associated with irritation in back of throat, and post-nasal drip. Occasionally short of breath after coughing from throat irritation. Most concerned about going back to the gym, lifting weights. \"Everyone is giving me different activity limitations. I just hate not working out. \" No fevers or chills. No nighttime waking with symptoms. Completed the zpak, advair & PO steroid regimen. Past Medical History:  
Diagnosis Date  Hypercholesterolemia Past Surgical History:  
Procedure Laterality Date  HX APPENDECTOMY  age 27  
 
 reports that he has never smoked. He has never used smokeless tobacco. He reports that he drinks alcohol. He reports that he does not use illicit drugs. Social History Social History Narrative History Smoking Status  Never Smoker Smokeless Tobacco  
 Never Used Family History Problem Relation Age of Onset  Prostate Cancer Father   
  prostate No Known Allergies Problem List:  
  
Patient Active Problem List  
 Diagnosis  Vitamin D insufficiency  Elevated hemoglobin A1c  
 Erectile dysfunction  Routine health maintenance  Onychomycosis of toenail  Family history of prostate cancer Medications:  
 
Current Outpatient Prescriptions Medication Sig  
 albuterol (PROVENTIL HFA, VENTOLIN HFA, PROAIR HFA) 90 mcg/actuation inhaler Take 1-2 Puffs by inhalation every six (6) hours as needed for Wheezing or Shortness of Breath (cough).  MULTIVITS-MINERALS/FA/LYCOPENE (ONE-A-DAY MEN'S MULTIVITAMIN PO) Take  by mouth.  omega-3 fatty acids-vitamin e (FISH OIL) 1,000 mg cap Take 1 Cap by mouth. No current facility-administered medications for this visit. Review of Systems:  
 
Review of Systems Constitutional: Negative for chills, fever and weight loss. HENT: Positive for sore throat. Negative for congestion, ear pain and nosebleeds. Respiratory: Positive for cough, sputum production, shortness of breath and wheezing. Cardiovascular: Negative for chest pain. Gastrointestinal: Negative. Musculoskeletal: Negative. Neurological: Negative. Negative for weakness. Physical Assessment:  
VS:   
Vitals:  
 07/30/18 1121 07/30/18 1135 BP: 140/82 128/70 Pulse: 88 Resp: 18 Temp: 98.5 °F (36.9 °C) TempSrc: Oral   
SpO2: 98% Weight: 198 lb 3.2 oz (89.9 kg) Height: 5' 7.99\" (1.727 m) PF: 680 L/min PainSc:   0 - No pain Physical Exam  
Constitutional: He is oriented to person, place, and time. He appears well-developed and well-nourished. No distress. + very muscular build HENT:  
Head: Normocephalic and atraumatic. Right Ear: External ear normal.  
Left Ear: External ear normal.  
Mouth/Throat: Oropharynx is clear and moist.  
+ postnasal drip Eyes: EOM are normal. Pupils are equal, round, and reactive to light. Neck: Normal range of motion. Neck supple. Cardiovascular: Normal rate, regular rhythm and normal heart sounds. No murmur heard. Pulmonary/Chest: Effort normal and breath sounds normal. No respiratory distress. He has no wheezes. Musculoskeletal: Normal range of motion. Neurological: He is alert and oriented to person, place, and time. No cranial nerve deficit. Skin: Skin is warm and dry.  He is not diaphoretic. Psychiatric: He has a normal mood and affect. His behavior is normal. Judgment and thought content normal.  
Nursing note reviewed.

## 2018-07-30 NOTE — PATIENT INSTRUCTIONS
October script returned and destroyed.   To Do: · Restart flonase 2 sprays each nostril daily. · Use your albuterol (Ventolin) with the spacer (plastic tube) as needed Notes from your doctor: · Goal = flonase + albuterol as needed = really well controlled asthma.  IF... You don't stay well controlled like this, we will send you to pulmonology

## 2019-05-07 ENCOUNTER — OFFICE VISIT (OUTPATIENT)
Dept: FAMILY MEDICINE CLINIC | Age: 49
End: 2019-05-07

## 2019-05-07 VITALS
HEIGHT: 68 IN | BODY MASS INDEX: 29.1 KG/M2 | OXYGEN SATURATION: 95 % | DIASTOLIC BLOOD PRESSURE: 78 MMHG | RESPIRATION RATE: 18 BRPM | TEMPERATURE: 97.9 F | HEART RATE: 74 BPM | WEIGHT: 192 LBS | SYSTOLIC BLOOD PRESSURE: 133 MMHG

## 2019-05-07 DIAGNOSIS — Z80.42 FAMILY HISTORY OF PROSTATE CANCER: ICD-10-CM

## 2019-05-07 DIAGNOSIS — E55.9 VITAMIN D INSUFFICIENCY: ICD-10-CM

## 2019-05-07 DIAGNOSIS — R73.09 ELEVATED HEMOGLOBIN A1C: ICD-10-CM

## 2019-05-07 DIAGNOSIS — Z00.00 ROUTINE HEALTH MAINTENANCE: ICD-10-CM
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
## 2019-05-07 DIAGNOSIS — Z00.00 ROUTINE HEALTH MAINTENANCE: Primary | ICD-10-CM

## 2019-05-07 DIAGNOSIS — J45.20 MILD INTERMITTENT REACTIVE AIRWAY DISEASE WITHOUT COMPLICATION: ICD-10-CM

## 2019-05-07 DIAGNOSIS — S30.860A TICK BITE OF LOWER BACK, INITIAL ENCOUNTER: ICD-10-CM

## 2019-05-07 RX ORDER — ALBUTEROL SULFATE 90 UG/1
1-2 AEROSOL, METERED RESPIRATORY (INHALATION)
Qty: 1 INHALER | Refills: 2 | Status: SHIPPED | OUTPATIENT
Start: 2019-05-07 | End: 2020-06-04 | Stop reason: SDUPTHER

## 2019-05-07 RX ORDER — DOXYCYCLINE 100 MG/1
200 TABLET ORAL ONCE
Qty: 2 TAB | Refills: 0 | Status: SHIPPED | OUTPATIENT
Start: 2019-05-07 | End: 2019-05-07

## 2019-05-07 RX ORDER — FLUTICASONE PROPIONATE 50 MCG
2 SPRAY, SUSPENSION (ML) NASAL DAILY
Qty: 1 BOTTLE | Refills: 2 | Status: SHIPPED | OUTPATIENT
Start: 2019-05-07 | End: 2022-06-10

## 2019-05-07 NOTE — PROGRESS NOTES
[facility] Office Visit - Complete Physical 
 
Malou Shaikh is a 54 y.o. male presenting for annual physical. 
: 1970 Assessment/Plan: 1. Routine health maintenance 
- HEMOGLOBIN A1C WITH EAG; Future - METABOLIC PANEL, COMPREHENSIVE; Future - T4, FREE; Future 
- TSH 3RD GENERATION; Future - PSA W/ REFLX FREE PSA; Future 2. Family history of prostate cancer Discussed Counts include 234 beds at the [facility] guidelines for screening. Agreeable to the q2yr interval starting at 40. 
- PSA W/ REFLX FREE PSA; Future 3. Vitamin D insufficiency Unclear control. 
- VITAMIN D, 25 HYDROXY; Future 4. Elevated hemoglobin A1c Unclear control. 
- HEMOGLOBIN A1C WITH EAG; Future - METABOLIC PANEL, COMPREHENSIVE; Future 5. Mild intermittent reactive airway disease without complication Well controlled. Continue PRN albuterol and flonase for nasal sx from allergies. - albuterol (PROVENTIL HFA, VENTOLIN HFA, PROAIR HFA) 90 mcg/actuation inhaler; Take 1-2 Puffs by inhalation every six (6) hours as needed for Wheezing or Shortness of Breath (cough). Dispense: 1 Inhaler; Refill: 2 6. Tick bite of lower back, initial encounter Tick was identified, presents with sx c/w erythema migrans with 72h, in high incidence area per CDC. Meets criteria for prophylaxis. - doxycycline (ADOXA) 100 mg tablet; Take 2 Tabs by mouth once for 1 dose. Indications: Treatment to Prevent Lyme Disease  Dispense: 2 Tab; Refill: 0 Orders & Diagnoses associated with This Encounter: ICD-10-CM ICD-9-CM 1. Routine health maintenance Z00.00 V70.0 2. Mild intermittent asthma with acute exacerbation J45.21 493.92  
3. Family history of prostate cancer Z80.42 V16.42  
4. Vitamin D insufficiency E55.9 268.9 5. Elevated hemoglobin A1c R73.09 790.29 Orders Placed This Encounter  VITAMIN D, 25 HYDROXY Standing Status:   Future   Standing Expiration Date:   2020  
 HEMOGLOBIN A1C WITH EAG  
 Standing Status:   Future Standing Expiration Date:   2020  METABOLIC PANEL, COMPREHENSIVE Standing Status:   Future Standing Expiration Date:   2020  T4, FREE Standing Status:   Future Standing Expiration Date:   2020  TSH 3RD GENERATION Standing Status:   Future Standing Expiration Date:   2020  PSA W/ REFLX FREE PSA Standing Status:   Future Standing Expiration Date:   2020  
 doxycycline (ADOXA) 100 mg tablet Sig: Take 2 Tabs by mouth once for 1 dose. Indications: Treatment to Prevent Lyme Disease Dispense:  2 Tab Refill:  0  
 albuterol (PROVENTIL HFA, VENTOLIN HFA, PROAIR HFA) 90 mcg/actuation inhaler Sig: Take 1-2 Puffs by inhalation every six (6) hours as needed for Wheezing or Shortness of Breath (cough). Dispense:  1 Inhaler Refill:  2  
 fluticasone propionate (FLONASE) 50 mcg/actuation nasal spray Si Sprays by Both Nostrils route daily. Indications: inflammation of the nose due to an allergy Dispense:  1 Bottle Refill:  2 Management plan & patient instructions reviewed with Dwayne Alberto, who voiced understanding. This document may have been created with the aid of dictation software. Text may contain errors, particularly phonetic errors. Jorge Castanon MD 
Internal Medicine, Family Medicine & Sports Medicine 2019 Patient Instructions (provided in AVS): To Do: 
· Let the  know you need bloodwork on your way out today · In order to prevent lyme disease, please take you 200mg of doxycycline once sometime day Well Visit, Ages 25 to 48: Care Instructions Tick Bite: Care Instructions History:  
Dwayne Alberto is a 54 y.o. male presenting for an annual physical. 
 
Tick bite working in the woods near [place], 2 days ago. Actually removed the tick himself. Since then, erythema and swelling to the area. No other symptoms. Asthma is generally well controlled. A bit worse during spring allergy season. Otherwise, doing well. No other complaints. Past Medical History:  
Diagnosis Date  Hypercholesterolemia Past Surgical History:  
Procedure Laterality Date  HX APPENDECTOMY  age 27  
 
 reports that he has never smoked. He has never used smokeless tobacco. He reports that he drinks alcohol. He reports that he does not use drugs. Social History Social History Narrative  Not on file Social History Tobacco Use Smoking Status Never Smoker Smokeless Tobacco Never Used Family History Problem Relation Age of Onset  Prostate Cancer Father   
     prostate No Known Allergies Problem List:  
  
Patient Active Problem List  
 Diagnosis  Mild intermittent asthma with acute exacerbation  Vitamin D insufficiency  Elevated hemoglobin A1c  
 Erectile dysfunction  Routine health maintenance  Onychomycosis of toenail  Family history of prostate cancer Medications:  
 
Current Outpatient Medications Medication Sig  
 fluticasone (FLONASE) 50 mcg/actuation nasal spray 2 Sprays by Both Nostrils route daily. Indications: Allergic Rhinitis  albuterol (PROVENTIL HFA, VENTOLIN HFA, PROAIR HFA) 90 mcg/actuation inhaler Take 1-2 Puffs by inhalation every six (6) hours as needed for Wheezing or Shortness of Breath (cough).  MULTIVITS-MINERALS/FA/LYCOPENE (ONE-A-DAY MEN'S MULTIVITAMIN PO) Take  by mouth.  omega-3 fatty acids-vitamin e (FISH OIL) 1,000 mg cap Take 1 Cap by mouth. No current facility-administered medications for this visit. Review of Systems:  
 
Review of Systems Constitutional: Negative for chills and fever. HENT: Negative for ear pain and sore throat. Respiratory: Positive for wheezing (rare, controlled with albuterol PRN). Negative for cough and shortness of breath. Cardiovascular: Negative for chest pain and palpitations. Gastrointestinal: Negative for abdominal pain. Genitourinary: Negative for dysuria. Musculoskeletal: Negative for myalgias. Skin: Positive for rash (assoc with tick bite; see HPI). Negative for itching. Neurological: Negative for speech change, focal weakness and headaches. Endo/Heme/Allergies: Does not bruise/bleed easily. Psychiatric/Behavioral: Negative for depression. The patient is not nervous/anxious and does not have insomnia. Physical Assessment:  
VS:   
Visit Vitals /78 Pulse 74 Temp 97.9 °F (36.6 °C) (Oral) Resp 18 Ht 5' 7.99\" (1.727 m) Wt 192 lb (87.1 kg) SpO2 95%  L/min BMI 29.20 kg/m² Physical Exam  
Constitutional: He is oriented to person, place, and time. He appears well-developed and well-nourished. No distress. + very muscular build HENT:  
Head: Normocephalic and atraumatic. Right Ear: Tympanic membrane, external ear and ear canal normal.  
Left Ear: Tympanic membrane, external ear and ear canal normal.  
Mouth/Throat: Oropharynx is clear and moist.  
Eyes: Pupils are equal, round, and reactive to light. EOM are normal.  
Neck: Normal range of motion. Neck supple. Cardiovascular: Normal rate, regular rhythm and normal heart sounds. No murmur heard. Pulmonary/Chest: Effort normal and breath sounds normal. No respiratory distress. He has no wheezes. Musculoskeletal: Normal range of motion. Neurological: He is alert and oriented to person, place, and time. No cranial nerve deficit. Skin: Skin is warm and dry. He is not diaphoretic. Psychiatric: He has a normal mood and affect. His behavior is normal. Judgment and thought content normal.  
Nursing note reviewed.

## 2019-05-07 NOTE — PATIENT INSTRUCTIONS
To Do: 
· Let the  know you need bloodwork on your way out today · In order to prevent lyme disease, please take you 200mg of doxycycline once sometime day Well Visit, Ages 25 to 48: Care Instructions Your Care Instructions Physical exams can help you stay healthy. Your doctor has checked your overall health and may have suggested ways to take good care of yourself. He or she also may have recommended tests. At home, you can help prevent illness with healthy eating, regular exercise, and other steps. Follow-up care is a key part of your treatment and safety. Be sure to make and go to all appointments, and call your doctor if you are having problems. It's also a good idea to know your test results and keep a list of the medicines you take. How can you care for yourself at home? · Reach and stay at a healthy weight. This will lower your risk for many problems, such as obesity, diabetes, heart disease, and high blood pressure. · Get at least 30 minutes of physical activity on most days of the week. Walking is a good choice. You also may want to do other activities, such as running, swimming, cycling, or playing tennis or team sports. Discuss any changes in your exercise program with your doctor. · Do not smoke or allow others to smoke around you. If you need help quitting, talk to your doctor about stop-smoking programs and medicines. These can increase your chances of quitting for good. · Talk to your doctor about whether you have any risk factors for sexually transmitted infections (STIs). Having one sex partner (who does not have STIs and does not have sex with anyone else) is a good way to avoid these infections. · Use birth control if you do not want to have children at this time. Talk with your doctor about the choices available and what might be best for you. · Protect your skin from too much sun. When you're outdoors from 10 a.m. to 4 p.m., stay in the shade or cover up with clothing and a hat with a wide brim. Wear sunglasses that block UV rays. Even when it's cloudy, put broad-spectrum sunscreen (SPF 30 or higher) on any exposed skin. · See a dentist one or two times a year for checkups and to have your teeth cleaned. · Wear a seat belt in the car. · Drink alcohol in moderation, if at all. That means no more than 2 drinks a day for men and 1 drink a day for women. Follow your doctor's advice about when to have certain tests. These tests can spot problems early. For everyone · Cholesterol. Have the fat (cholesterol) in your blood tested after age 21. Your doctor will tell you how often to have this done based on your age, family history, or other things that can increase your risk for heart disease. · Blood pressure. Have your blood pressure checked during a routine doctor visit. Your doctor will tell you how often to check your blood pressure based on your age, your blood pressure results, and other factors. · Vision. Talk with your doctor about how often to have a glaucoma test. 
· Diabetes. Ask your doctor whether you should have tests for diabetes. · Colon cancer. Have a test for colon cancer at age 48. You may have one of several tests. If you are younger than 48, you may need a test earlier if you have any risk factors. Risk factors include whether you already had a precancerous polyp removed from your colon or whether your parent, brother, sister, or child has had colon cancer. For women · Breast exam and mammogram. Talk to your doctor about when you should have a clinical breast exam and a mammogram. Medical experts differ on whether and how often women under 50 should have these tests. Your doctor can help you decide what is right for you. · Pap test and pelvic exam. Begin Pap tests at age 24. A Pap test is the best way to find cervical cancer.  The test often is part of a pelvic exam. Ask how often to have this test. 
 · Tests for sexually transmitted infections (STIs). Ask whether you should have tests for STIs. You may be at risk if you have sex with more than one person, especially if your partners do not wear condoms. For men · Tests for sexually transmitted infections (STIs). Ask whether you should have tests for STIs. You may be at risk if you have sex with more than one person, especially if you do not wear a condom. · Testicular cancer exam. Ask your doctor whether you should check your testicles regularly. · Prostate exam. Talk to your doctor about whether you should have a blood test (called a PSA test) for prostate cancer. Experts differ on whether and when men should have this test. Some experts suggest it if you are older than 39 and are -American or have a father or brother who got prostate cancer when he was younger than 72. When should you call for help? Watch closely for changes in your health, and be sure to contact your doctor if you have any problems or symptoms that concern you. Where can you learn more? Go to http://surendra-campos.info/. Enter P072 in the search box to learn more about \"Well Visit, Ages 25 to 48: Care Instructions. \" Current as of: March 28, 2018 Content Version: 11.9 © 2658-9421 [employer]. Care instructions adapted under license by [employer] (which disclaims liability or warranty for this information). If you have questions about a medical condition or this instruction, always ask your healthcare professional. [place] [address] any warranty or liability for your use of this information. Tick Bite: Care Instructions Your Care Instructions Ticks are small spiderlike animals. They bite to fasten themselves onto your skin and feed on your blood. Ticks can carry diseases. But most ticks do not carry diseases, and most tick bites do not cause serious health problems. Some people may have an allergic reaction to a tick bite. This reaction may be mild, with symptoms like itching and swelling. In rare cases, a severe allergic reaction may occur. Most of the time, all you need to do for a tick bite is relieve any symptoms you may have. Follow-up care is a key part of your treatment and safety. Be sure to make and go to all appointments, and call your doctor if you are having problems. It's also a good idea to know your test results and keep a list of the medicines you take. How can you care for yourself at home? · Put ice or a cold pack on the bite for 15 to 20 minutes once an hour. Put a thin cloth between the ice and your skin. · Try an over-the-counter medicine to relieve itching, redness, swelling, and pain. Be safe with medicines. Read and follow all instructions on the label. ? Take an antihistamine medicine, such as a nondrowsy one like loratadine (Claritin) or one that might make you sleepy like diphenhydramine (Benadryl). These medicines may help relieve itching, redness, and swelling. ? Use a spray of local anesthetic that contains benzocaine, such as Solarcaine. It may help relieve pain. If your skin reacts to the spray, stop using it. ? Put calamine lotion on the skin. It may help relieve itching. To avoid tick bites · Avoid ticks: 
? Learn where ticks are found in your community, and stay away from those areas if possible. ? Cover as much of your body as possible when you work or play in grassy or wooded areas. ? Use insect repellents, such as products containing DEET. You can spray them on your skin. ? Take steps to control ticks on your property if you live in an area where Lyme disease occurs. Clear leaves, brush, tall grasses, woodpiles, and stone fences from around your house and the edges of your yard or garden. This may help get rid of ticks.  
· When you come in from outdoors, check your body for ticks, including your groin, head, and underarms. The ticks may be about the size of a sesame seed. If no one else can help you check for ticks on your scalp, comb your hair with a fine-tooth comb. · If you find a tick, remove it quickly. Use tweezers to grasp the tick as close to its mouth (the part in your skin) as possible. Slowly pull the tick straight outdo not twist or yankuntil its mouth releases from your skin. If part of the tick stays in the skin, leave it alone. It will likely come out on its own in a few days. · Ticks can come into your house on clothing, outdoor gear, and pets. These ticks can fall off and attach to you. ? Check your clothing and outdoor gear. Remove any ticks you find. Then put your clothing in a clothes dryer on high heat for 1 hour to kill any ticks that might remain. ? Check your pets for ticks after they have been outdoors. · When hiking in the woods, carry a small dry jar or ziplock bag. If you find a tick on your body, remove the tick and put it in the jar or bag. Store the container in the freezer so you can give it to your doctor if symptoms develop. The tick can be tested to learn whether it is carrying the bacteria that cause Lyme disease. When should you call for help? Call 911 anytime you think you may need emergency care. For example, call if: 
  · You have symptoms of a severe allergic reaction. These may include: 
? Sudden raised, red areas (hives) all over your body. ? Swelling of the throat, mouth, lips, or tongue. ? Trouble breathing. ? Passing out (losing consciousness). Or you may feel very lightheaded or suddenly feel weak, confused, or restless.  
 Call your doctor now or seek immediate medical care if: 
  · You have signs of infection, such as: 
? Increased pain, swelling, warmth, or redness around the bite. ? Red streaks leading from the bite. ? Pus draining from the bite.  
? A fever.  
 Watch closely for changes in your health, and be sure to contact your doctor if: 
  · You develop a new rash.  
  · You have joint pain.  
  · You are very tired.  
  · You have flu-like symptoms.  
  · You have symptoms for more than 1 week. Where can you learn more? Go to http://surendra-campos.info/. Enter B354 in the search box to learn more about \"Tick Bite: Care Instructions. \" Current as of: September 23, 2018 Content Version: 11.9 © 6125-7627 [employer]. Care instructions adapted under license by [employer] (which disclaims liability or warranty for this information). If you have questions about a medical condition or this instruction, always ask your healthcare professional. [place] [address] any warranty or liability for your use of this information.

## 2019-05-07 NOTE — PROGRESS NOTES
Tong Nina is a 54 y.o. male (: 1970) presenting to address: Chief Complaint Patient presents with  Tick Removal  
  patient stated he removed a tick from his back two days ago and now has swelling in that area. pain scale 0/10 Vitals:  
 19 1029 BP: 133/78 Pulse: 74 Resp: 18 Temp: 97.9 °F (36.6 °C) TempSrc: Oral  
SpO2: 95% Weight: 192 lb (87.1 kg) Height: 5' 7.99\" (1.727 m) PF: 600 L/min PainSc:   0 - No pain Hearing/Vision: No exam data present Learning Assessment:  
 
Learning Assessment 3/12/2014 PRIMARY LEARNER Patient HIGHEST LEVEL OF EDUCATION - PRIMARY LEARNER  GRADUATED HIGH SCHOOL OR GED  
BARRIERS PRIMARY LEARNER NONE PRIMARY LANGUAGE ENGLISH  
LEARNER PREFERENCE PRIMARY READING  
ANSWERED BY self RELATIONSHIP SELF Depression Screening:  
 
3 most recent PHQ Screens 2019 Little interest or pleasure in doing things Not at all Feeling down, depressed, irritable, or hopeless Not at all Total Score PHQ 2 0 Fall Risk Assessment:  
 
Fall Risk Assessment, last 12 mths 3/14/2018 Able to walk? Yes Fall in past 12 months? No  
 
Abuse Screening:  
 
Abuse Screening Questionnaire 3/14/2018 Do you ever feel afraid of your partner? Wilver Rendon Are you in a relationship with someone who physically or mentally threatens you? Wilver Rendon Is it safe for you to go home? Yenifer Palacios Coordination of Care Questionaire: 1. Have you been to the ER, urgent care clinic since your last visit? Hospitalized since your last visit? NO 
 
2. Have you seen or consulted any other health care providers outside of the [address] [place] [address] since your last visit? Include any pap smears or colon screening. NO Advanced Directive: 1. Do you have an Advanced Directive? NO 
 
2. Would you like information on Advanced Directives?  NO

## 2020-06-04 ENCOUNTER — TELEPHONE (OUTPATIENT)
Dept: FAMILY MEDICINE CLINIC | Age: 50
End: 2020-06-04

## 2020-06-04 DIAGNOSIS — Z80.42 FAMILY HISTORY OF PROSTATE CANCER: ICD-10-CM

## 2020-06-04 DIAGNOSIS — Z00.00 ROUTINE ADULT HEALTH MAINTENANCE: ICD-10-CM

## 2020-06-04 DIAGNOSIS — J45.20 MILD INTERMITTENT REACTIVE AIRWAY DISEASE WITHOUT COMPLICATION: ICD-10-CM

## 2020-06-04 DIAGNOSIS — R73.09 ELEVATED HEMOGLOBIN A1C: Primary | ICD-10-CM

## 2020-06-04 DIAGNOSIS — E55.9 VITAMIN D INSUFFICIENCY: ICD-10-CM

## 2020-06-04 NOTE — TELEPHONE ENCOUNTER
Spoke to patient and scheduled CPE as well as appt for labs. Future Appointments   Date Time Provider Leroy Weiss   2020  8:00 AM [facility] EVELIO LEE SCHED   2020 10:30 AM Melissa Mabry MD [facility] SCHED     Please order new labs since the ones in system are . Also patient is requesting refill of inhaler.     Last filled 19 qty 1 w/ 2 refills

## 2020-06-08 DIAGNOSIS — R73.09 ELEVATED HEMOGLOBIN A1C: ICD-10-CM

## 2020-06-08 DIAGNOSIS — Z00.00 ROUTINE ADULT HEALTH MAINTENANCE: ICD-10-CM

## 2020-06-08 DIAGNOSIS — E55.9 VITAMIN D INSUFFICIENCY: ICD-10-CM

## 2020-06-08 DIAGNOSIS — Z80.42 FAMILY HISTORY OF PROSTATE CANCER: ICD-10-CM

## 2020-06-08 RX ORDER — ALBUTEROL SULFATE 90 UG/1
1-2 AEROSOL, METERED RESPIRATORY (INHALATION)
Qty: 1 INHALER | Refills: 2 | Status: SHIPPED | OUTPATIENT
Start: 2020-06-08 | End: 2022-06-10 | Stop reason: SDUPTHER

## 2020-06-08 NOTE — TELEPHONE ENCOUNTER
Orders Placed This Encounter    HEMOGLOBIN A1C WITH EAG     Standing Status:   Future     Standing Expiration Date:   6/8/2021    CBC WITH AUTOMATED DIFF     Standing Status:   Future     Standing Expiration Date:   4/0/1762    METABOLIC PANEL, COMPREHENSIVE     Standing Status:   Future     Standing Expiration Date:   6/8/2021    T4, FREE     Standing Status:   Future     Standing Expiration Date:   6/8/2021    LIPID PANEL     Standing Status:   Future     Standing Expiration Date:   6/8/2021    TSH 3RD GENERATION     Standing Status:   Future     Standing Expiration Date:   6/8/2021    VITAMIN D, 25 HYDROXY     Standing Status:   Future     Standing Expiration Date:   6/8/2021    PSA W/ REFLX FREE PSA     Standing Status:   Future     Standing Expiration Date:   6/9/2021    albuterol (PROVENTIL HFA, VENTOLIN HFA, PROAIR HFA) 90 mcg/actuation inhaler     Sig: Take 1-2 Puffs by inhalation every six (6) hours as needed for Wheezing or Shortness of Breath (cough).      Dispense:  1 Inhaler     Refill:  2

## 2021-06-15 ENCOUNTER — OFFICE VISIT (OUTPATIENT)
Dept: FAMILY MEDICINE CLINIC | Age: 51
End: 2021-06-15
Payer: COMMERCIAL

## 2021-06-15 VITALS
DIASTOLIC BLOOD PRESSURE: 71 MMHG | RESPIRATION RATE: 16 BRPM | BODY MASS INDEX: 30.25 KG/M2 | OXYGEN SATURATION: 98 % | SYSTOLIC BLOOD PRESSURE: 132 MMHG | WEIGHT: 199.6 LBS | HEIGHT: 68 IN | HEART RATE: 67 BPM | TEMPERATURE: 98.8 F

## 2021-06-15 DIAGNOSIS — R73.09 ELEVATED HEMOGLOBIN A1C: ICD-10-CM

## 2021-06-15 DIAGNOSIS — G47.00 INSOMNIA, UNSPECIFIED TYPE: ICD-10-CM

## 2021-06-15 DIAGNOSIS — Z80.42 FAMILY HISTORY OF PROSTATE CANCER: ICD-10-CM

## 2021-06-15 DIAGNOSIS — R06.83 SNORING: ICD-10-CM

## 2021-06-15 DIAGNOSIS — Z87.828 HISTORY OF TRAUMA: ICD-10-CM

## 2021-06-15 DIAGNOSIS — Z23 ENCOUNTER FOR IMMUNIZATION: ICD-10-CM

## 2021-06-15 DIAGNOSIS — Z00.00 ROUTINE ADULT HEALTH MAINTENANCE: Primary | ICD-10-CM

## 2021-06-15 DIAGNOSIS — R53.83 FATIGUE, UNSPECIFIED TYPE: ICD-10-CM

## 2021-06-15 DIAGNOSIS — E55.9 VITAMIN D INSUFFICIENCY: ICD-10-CM

## 2021-06-15 PROCEDURE — 99396 PREV VISIT EST AGE 40-64: CPT | Performed by: FAMILY MEDICINE

## 2021-06-15 PROCEDURE — 90471 IMMUNIZATION ADMIN: CPT | Performed by: FAMILY MEDICINE

## 2021-06-15 PROCEDURE — 90732 PPSV23 VACC 2 YRS+ SUBQ/IM: CPT | Performed by: FAMILY MEDICINE

## 2021-06-15 NOTE — PROGRESS NOTES
Note to patient:  The purpose of this note is to communicate optimally with the other physicians / APCs involved in your care. It is written using standard medical terminology. If you have questions regarding the details of the note, please contact my office to schedule an appointment to address your questions. [address]   Primary Care Office Visit - Complete Physical    Alina Angelo is a 54 y.o. male presenting for annual physical.  : 1970        Assessment/Plan:       ICD-10-CM ICD-9-CM   1. Routine adult health maintenance  Z00.00 V70.0   2. Family history of prostate cancer  Z80.42 V16.42   3. Encounter for immunization  Z23 V03.89   4. Elevated hemoglobin A1c  R73.09 790.29   5. Vitamin D insufficiency  E55.9 268.9   6. Fatigue, unspecified type  R53.83 780.79   7. Insomnia, unspecified type  G47.00 780.52   8. Snoring  R06.83 786.09   9.  History of trauma  Z87.828 V15.59       # HM   > labs, including PSA (via shared-decision making)  > PPSV23 given today (hx asthma)    # elevated A1c  # VitD def  Unclear control   > labs    # fatigue  # insomnia  # snoring  Initial encounter  > labs  > referral to sleep medicine      # hx of trauma - initial encounter  > counseled re: mindfulness strategies / opportunities, particularly as he'd prefer to avoid formal counseling and pharmacotherapy        Orders Placed This Encounter    Pneumococcal polysaccharide vaccine, 23-valent, adult or immunosuppressed pt dose    HEMOGLOBIN A1C WITH EAG     Standing Status:   Future     Number of Occurrences:   1     Standing Expiration Date:   6/15/2022    CBC WITH AUTOMATED DIFF     Standing Status:   Future     Number of Occurrences:   1     Standing Expiration Date:       METABOLIC PANEL, COMPREHENSIVE     Standing Status:   Future     Number of Occurrences:   1     Standing Expiration Date:   6/15/2022    T4, FREE     Standing Status:   Future     Number of Occurrences:   1 Standing Expiration Date:   6/15/2022    LIPID PANEL     Standing Status:   Future     Number of Occurrences:   1     Standing Expiration Date:   6/15/2022    TSH 3RD GENERATION     Standing Status:   Future     Number of Occurrences:   1     Standing Expiration Date:   6/15/2022    VITAMIN D, 25 HYDROXY     Standing Status:   Future     Number of Occurrences:   1     Standing Expiration Date:   6/15/2022    PSA W/ REFLX FREE PSA     Standing Status:   Future     Number of Occurrences:   1     Standing Expiration Date:   6/16/2022    VITAMIN B12     Standing Status:   Future     Number of Occurrences:   1     Standing Expiration Date:   6/16/2022    HEMOGLOBIN A1C W/O EAG    SEND-OUT TEST    SLEEP MEDICINE REFERRAL     Referral Priority:   Routine     Referral Type:   Consultation     Referral Reason:   Specialty Services Required     Referred to Provider:   Roni Sow MD     Number of Visits Requested:   1    CO IMMUNIZ ADMIN,1 SINGLE/COMB VAC/TOXOID         Management plan & patient instructions reviewed with Pete Arellano, who voiced understanding. This document may have been created with the aid of dictation   software. Text may contain errors, particularly phonetic errors. Ilir Hill MD  Internal Medicine, Family Medicine & Sports Medicine  6/15/2021         Subjective   History:   Pete Arellano is a 54 y.o. male presenting for an annual physical.    last visit: CPE May 2019    Hx of significant trauma in 2019  Since then, issues with insomnia (specifically staying asleep), and fatigue. Also noted to have snoring. Has seen mental health professionals / counseling \"and they tried me on medications, and it was awful. I'm sorry but I'm not doing that again\". Really only gets 2 nights of good sleep per week. Still exercising regularly. WILIAN 2/7 6/15/2021   Feeling nervous, anxious or on edge? 0   Not being able to stop or control worrying?  1   WILIAN-2 Subtotal 1   Worrying too much about different things? 2   Trouble relaxing? 2   Being so restless that it is hard to sit still? 0   Becoming easily annoyed or irritable? 1   Feeling afraid as if something awful might happen? 1   WILIAN-7 Total Score 7   If you checked off any problems, how difficult have these problems made it for you to do your work, take care of thinks at home, or get along with other people? Not at all difficult       3 most recent PHQ Screens 6/15/2021   PHQ Not Done Active Diagnosis of Depression or Bipolar Disorder   Little interest or pleasure in doing things More than half the days   Feeling down, depressed, irritable, or hopeless More than half the days   Total Score PHQ 2 4   Trouble falling or staying asleep, or sleeping too much More than half the days   Feeling tired or having little energy Nearly every day   Poor appetite, weight loss, or overeating Several days   Feeling bad about yourself - or that you are a failure or have let yourself or your family down Nearly every day   Trouble concentrating on things such as school, work, reading, or watching TV Several days   Moving or speaking so slowly that other people could have noticed; or the opposite being so fidgety that others notice Not at all   Thoughts of being better off dead, or hurting yourself in some way Several days   PHQ 9 Score 15   How difficult have these problems made it for you to do your work, take care of your home and get along with others Not difficult at all         Past Medical History:   Diagnosis Date    Hypercholesterolemia      Past Surgical History:   Procedure Laterality Date    HX APPENDECTOMY  age 27      reports that he has never smoked. He has never used smokeless tobacco. He reports current alcohol use. He reports that he does not use drugs.   Social History     Social History Narrative    Not on file     Social History     Tobacco Use   Smoking Status Never Smoker   Smokeless Tobacco Never Used     Family History   Problem Relation Age of Onset    Prostate Cancer Father         prostate     No Known Allergies    Problem List:      Patient Active Problem List    Diagnosis    Mild intermittent asthma with acute exacerbation    Vitamin D insufficiency    Elevated hemoglobin A1c    Erectile dysfunction    Onychomycosis of toenail    Family history of prostate cancer       Medications:     Current Outpatient Medications   Medication Sig    MULTIVITS-MINERALS/FA/LYCOPENE (ONE-A-DAY MEN'S MULTIVITAMIN PO) Take  by mouth.  omega-3 fatty acids-vitamin e (FISH OIL) 1,000 mg cap Take 1 Cap by mouth.  albuterol (PROVENTIL HFA, VENTOLIN HFA, PROAIR HFA) 90 mcg/actuation inhaler Take 1-2 Puffs by inhalation every six (6) hours as needed for Wheezing or Shortness of Breath (cough). (Patient not taking: Reported on 6/15/2021)    fluticasone propionate (FLONASE) 50 mcg/actuation nasal spray 2 Sprays by Both Nostrils route daily. Indications: inflammation of the nose due to an allergy (Patient not taking: Reported on 6/15/2021)     No current facility-administered medications for this visit. Review of Systems:     Review of Systems         Objective   Physical Assessment:   VS:    Visit Vitals  /71 (BP 1 Location: Left upper arm, BP Patient Position: Sitting, BP Cuff Size: Large adult)   Pulse 67   Temp 98.8 °F (37.1 °C) (Temporal)   Resp 16   Ht 5' 7.99\" (1.727 m)   Wt 199 lb 9.6 oz (90.5 kg)   SpO2 98%   BMI 30.36 kg/m²       Physical Exam  Nursing note reviewed. Constitutional:       General: He is not in acute distress. Appearance: He is well-developed. He is not diaphoretic. HENT:      Head: Normocephalic and atraumatic. Right Ear: Tympanic membrane, ear canal and external ear normal.      Left Ear: Tympanic membrane, ear canal and external ear normal.      Nose:      Comments: Mask in place  Eyes:      Pupils: Pupils are equal, round, and reactive to light.    Cardiovascular:      Rate and Rhythm: Normal rate and regular rhythm. Heart sounds: Normal heart sounds. No murmur heard. Pulmonary:      Effort: Pulmonary effort is normal. No respiratory distress. Breath sounds: Normal breath sounds. No wheezing. Musculoskeletal:         General: Normal range of motion. Cervical back: Normal range of motion and neck supple. Skin:     General: Skin is warm and dry. Neurological:      Mental Status: He is alert and oriented to person, place, and time. Cranial Nerves: No cranial nerve deficit. Psychiatric:         Behavior: Behavior normal.         Thought Content:  Thought content normal.         Judgment: Judgment normal.

## 2021-06-15 NOTE — PATIENT INSTRUCTIONS
- fasting labs tomorrow morning  - The sleep medicine / neurology office should be calling you to schedule in the next 1-2 weeks. If you don't hear from them after 2 weeks, call their office directly to check on the status of your referral.  - try the \"yoga nidra\"            Dr. Danish Rothman no longer practicing Primary Care effective July 1st, 2021       Due to increasing family obligations, I will no longer be practicing as a Primary Care physician effective July 1, 2021. Leading up to July 1st, I will work collaboratively with my current primary care colleagues at [facility]) to facilitate this transition for my primary care patients. If you have not been contacted about any appointments scheduled for after July 1, 2021, please assist [facility] in optimizing the continuity of your care by contacting the practice for appropriate and timely rescheduling. Dr. Danish Rothman will continue with [address] [place] [address] as a Sports Medicine Specialist      After July 1st, I will continue to serve the [facility] as a Sports Medicine Physician. My sports medicine practice will offer expertise in the non-operative treatment of acute and chronic musculoskeletal conditions, as well as non-musculoskeletal aspects of sports medicine for patients 12 years and older. Common examples of non-musculoskeletal sports medicine include: concussion (mild traumatic brain injury), exercise prescription, injury prevention, and return to [facility] decisions for the , the [employer] and the [employer]. I hope that you and your loved ones will keep me in mind for your sports medicine needs.     Before July 1st:   After July 1st: [phone]        TESTING RESULTS       If you have MyChart access:   Per federal regulations as of October 20th, 2020, all of your results will be released to you and to your physician / provider simultaneously on [address]    o This means that it is likely that you will have the opportunity to review your results before your physician / provider!  Since all \"critical\" abnormal results are immediately called to the office / on-call providers on nights, weekends and holidays - please refrain from calling after hours when you receive abnormal results through [address] Instead, allow time for your physician / provider to review your results and then provide interpretation and/or guidance.  If the results are significantly abnormal and require time-sensitive action - guidance will be provided both via [email or web address] and via telephone.  For all other results (interpreted as \"normal\", \"abnormal but expected\", \"reassuring / stable\", \"slightly abnormal\") - non-urgent guidance will be provided via [email or web address] communication only. Koko  #[phone]      If you do NOT have [email or web address] access:   If the results are significantly abnormal and require time-sensitive action - guidance will be relayed to you via telephone.  For all other results (interpreted as \"normal\", \"abnormal but expected\", \"reassuring / stable\", \"slightly abnormal\") - non-urgent guidance will be mailed to you via [employer] Postal Service      Results from Outside Facilities / Laboratories:  Results of tests performed at outside facilities / laboratories likely will not appear in the [employer].  o They may appear in the patient portals of those outside facilities / laboratories. o Please keep in mind that with your access to your patient portal directly to an outside facility / laboratory, you are likely viewing results before your physician / provider! Please allow time for your physician / provider to review your results and then provide interpretation and/or guidance. If you have questions about your results beyond the guidance provided in [employer] or in your results letter, please schedule a follow up appointment to discuss with your physician / provider. MEDICATION REFILLS         Please request medication refills through your pharmacy, to ensure the correct pharmacy is used.  Please allow at least 2 business days for refill requests to be addressed.  Refills will not be provided by the after hours/on call provider. Pneumococcal Polysaccharide Vaccine: What You Need to Know  Why get vaccinated? Pneumococcal polysaccharide vaccine (PPSV23) can prevent pneumococcal disease. Pneumococcal disease refers to any illness caused by pneumococcal bacteria. These bacteria can cause many types of illnesses, including pneumonia, which is an infection of the lungs. Pneumococcal bacteria are one of the most common causes of pneumonia. Besides pneumonia, pneumococcal bacteria can also cause:  · Ear infections,  · Sinus infections  · Meningitis (infection of the tissue covering the brain and spinal cord)  · Bacteremia (bloodstream infection)  Anyone can get pneumococcal disease, but children under 3years of age, people with certain medical conditions, adults 72 years or older, and cigarette smokers are at the highest risk. Most pneumococcal infections are mild. However, some can result in long-term problems, such as brain damage or hearing loss. Meningitis, bacteremia, and pneumonia caused by pneumococcal disease can be fatal.  PPSV23  PPSV23 protects against 23 types of bacteria that cause pneumococcal disease. PPSV23 is recommended for:  · All adults 72 years or older,  · Anyone 2 years or older with certain medical conditions that can lead to an increased risk for pneumococcal disease. Most people need only one dose of PPSV23. A second dose of PPSV23, and another type of pneumococcal vaccine called PCV13, are recommended for certain high-risk groups. Your health care provider can give you more information.   People 65 years or older should get a dose of PPSV23 even if they have already gotten one or more doses of the vaccine before they turned 72.  Talk with your health care provider  Tell your vaccine provider if the person getting the vaccine:  · Has had an allergic reaction after a previous dose of PPSV23, or has any severe, life-threatening allergies. In some cases, your health care provider may decide to postpone PPSV23 vaccination to a future visit. People with minor illnesses, such as a cold, may be vaccinated. People who are moderately or severely ill should usually wait until they recover before getting PPSV23. Your health care provider can give you more information. Risks of a vaccine reaction  · Redness or pain where the shot is given, feeling tired, fever, or muscle aches can happen after PPSV23. People sometimes faint after medical procedures, including vaccination. Tell your provider if you feel dizzy or have vision changes or ringing in the ears. As with any medicine, there is a very remote chance of a vaccine causing a severe allergic reaction, other serious injury, or death. What if there is a serious problem? An allergic reaction could occur after the vaccinated person leaves the clinic. If you see signs of a severe allergic reaction (hives, swelling of the face and throat, difficulty breathing, a fast heartbeat, dizziness, or weakness), call 9-1-1 and get the person to the nearest hospital.  For other signs that concern you, call your health care provider. Adverse reactions should be reported to the Vaccine Adverse Event Reporting System (VAERS). Your health care provider will usually file this report, or you can do it yourself. Visit the VAERS website at www.vaers. hhs.gov at www.vaers. hhs.gov or call 1-863.337.1473. VAERS is only for reporting reactions, and VAERS staff do not give medical advice. How can I learn more? · Ask your health care provider. · Call your local or state health department. · Contact the Centers for Disease Control and Prevention (CDC):  ? Call 4-169.893.9092 (1-800-CDC-INFO) or  ?  Visit CDC's website at www.cdc.gov/vaccines  Vaccine Information Statement  PPSV23 Vaccine  10/30/2019  [facility] and [facility] for Disease Control and Prevention  Many Vaccine Information Statements are available in Greek and other languages. See www.immunize.org/vis. Hojas de información Sobre Vacunas están disponibles en español y en muchos otros idiomas. Visite [employer]. Care instructions adapted under license by [employer] (which disclaims liability or warranty for this information). If you have questions about a medical condition or this instruction, always ask your healthcare professional. [place] [address] any warranty or liability for your use of this information.

## 2021-06-15 NOTE — PROGRESS NOTES
Immunization of the Pneumovax 23 vaccine was administered 6/15/2021 by Renato Karimi LPN. Patient tolerated procedure well. No reactions noted.

## 2021-06-15 NOTE — PROGRESS NOTES
Bruno Mcmahon is a 54 y.o. male (: 1970) presenting to address:    Chief Complaint   Patient presents with    Complete Physical    Immunization/Injection     pneumo 23     Bilateral ear lavage/irrigation completed to remove impacted cerumen. Patient tolerated well and has no questions at this time. Vitals:    06/15/21 1305   BP: 132/71   Pulse: 67   Resp: 16   Temp: 98.8 °F (37.1 °C)   TempSrc: Temporal   SpO2: 98%   Weight: 199 lb 9.6 oz (90.5 kg)   Height: 5' 7.99\" (1.727 m)   PainSc:   0 - No pain       Hearing/Vision:   No exam data present    Learning Assessment:     Learning Assessment 3/12/2014   PRIMARY LEARNER Patient   HIGHEST LEVEL OF EDUCATION - PRIMARY LEARNER  GRADUATED HIGH SCHOOL OR GED   BARRIERS PRIMARY LEARNER NONE   PRIMARY LANGUAGE ENGLISH   LEARNER PREFERENCE PRIMARY READING   ANSWERED BY self   RELATIONSHIP SELF     Depression Screening:     3 most recent PHQ Screens 6/15/2021   PHQ Not Done Active Diagnosis of Depression or Bipolar Disorder   Little interest or pleasure in doing things -   Feeling down, depressed, irritable, or hopeless -   Total Score PHQ 2 -     Fall Risk Assessment:     Fall Risk Assessment, last 12 mths 6/15/2021   Able to walk? Yes   Fall in past 12 months? 0   Do you feel unsteady? 0   Are you worried about falling 0     Abuse Screening:     Abuse Screening Questionnaire 6/15/2021   Do you ever feel afraid of your partner? N   Are you in a relationship with someone who physically or mentally threatens you? N   Is it safe for you to go home?  Y     ADL Assessment:     ADL Assessment 2018   Feeding yourself No Help Needed   Getting from bed to chair No Help Needed   Getting dressed No Help Needed   Bathing or showering No Help Needed   Walk across the room (includes cane/walker) No Help Needed   Using the telphone No Help Needed   Taking your medications No Help Needed   Preparing meals No Help Needed   Managing money (expenses/bills) No Help Needed Moderately strenuous housework (laundry) No Help Needed   Shopping for personal items (toiletries/medicines) No Help Needed   Shopping for groceries No Help Needed   Driving No Help Needed   Climbing a flight of stairs No Help Needed   Getting to places beyond walking distances No Help Needed        Coordination of Care Questionaire:   1. Have you been to the ER, urgent care clinic since your last visit? Hospitalized since your last visit? YES Urgent care x 6 months ago    2. Have you seen or consulted any other health care providers outside of the [address] [place] [address] [place] since your last visit? Include any pap smears or colon screening. NO    Advanced Directive:   1. Do you have an Advanced Directive? NO    2. Would you like information on Advanced Directives?  NO

## 2021-06-16 ENCOUNTER — APPOINTMENT (OUTPATIENT)
Dept: FAMILY MEDICINE CLINIC | Age: 51
End: 2021-06-16

## 2021-06-16 DIAGNOSIS — Z00.00 ROUTINE ADULT HEALTH MAINTENANCE: ICD-10-CM

## 2021-06-16 DIAGNOSIS — R73.09 ELEVATED HEMOGLOBIN A1C: ICD-10-CM

## 2021-06-16 DIAGNOSIS — R53.83 FATIGUE, UNSPECIFIED TYPE: ICD-10-CM

## 2021-06-16 DIAGNOSIS — Z80.42 FAMILY HISTORY OF PROSTATE CANCER: ICD-10-CM

## 2021-06-16 DIAGNOSIS — E55.9 VITAMIN D INSUFFICIENCY: ICD-10-CM

## 2021-06-18 LAB
25(OH)D3 SERPL-MCNC: 29.5 NG/ML (ref 32–100)
A-G RATIO,AGRAT: 1.5 RATIO (ref 1.1–2.6)
ABSOLUTE LYMPHOCYTE COUNT, 10803: 2 K/UL (ref 1–4.8)
ALBUMIN SERPL-MCNC: 4.1 G/DL (ref 3.5–5)
ALP SERPL-CCNC: 74 U/L (ref 25–115)
ALT SERPL-CCNC: 22 U/L (ref 5–40)
ANION GAP SERPL CALC-SCNC: 10 MMOL/L (ref 3–15)
AST SERPL W P-5'-P-CCNC: 19 U/L (ref 10–37)
AVG GLU, 10930: 110 MG/DL (ref 91–123)
BASOPHILS # BLD: 0 K/UL (ref 0–0.2)
BASOPHILS NFR BLD: 1 % (ref 0–2)
BILIRUB SERPL-MCNC: 0.4 MG/DL (ref 0.2–1.2)
BUN SERPL-MCNC: 15 MG/DL (ref 6–22)
CALCIUM SERPL-MCNC: 9.1 MG/DL (ref 8.4–10.5)
CHLORIDE SERPL-SCNC: 104 MMOL/L (ref 98–110)
CHOLEST SERPL-MCNC: 156 MG/DL (ref 110–200)
CO2 SERPL-SCNC: 26 MMOL/L (ref 20–32)
CREAT SERPL-MCNC: 0.9 MG/DL (ref 0.5–1.2)
EOSINOPHIL # BLD: 0.1 K/UL (ref 0–0.5)
EOSINOPHIL NFR BLD: 1 % (ref 0–6)
ERYTHROCYTE [DISTWIDTH] IN BLOOD BY AUTOMATED COUNT: 13.3 % (ref 10–15.5)
GFRAA, 66117: >60
GFRNA, 66118: >60
GLOBULIN,GLOB: 2.7 G/DL (ref 2–4)
GLUCOSE SERPL-MCNC: 83 MG/DL (ref 70–99)
GRANULOCYTES,GRANS: 48 % (ref 40–75)
HBA1C MFR BLD HPLC: 5.5 % (ref 4.8–5.6)
HCT VFR BLD AUTO: 42.6 % (ref 39.3–51.6)
HDLC SERPL-MCNC: 27 MG/DL
HDLC SERPL-MCNC: 5.8 MG/DL (ref 0–5)
HGB BLD-MCNC: 13.5 G/DL (ref 13.1–17.2)
LDL/HDL RATIO,LDHD: 2.7
LDLC SERPL CALC-MCNC: 73 MG/DL (ref 50–99)
LYMPHOCYTES, LYMLT: 42 % (ref 20–45)
MCH RBC QN AUTO: 29 PG (ref 26–34)
MCHC RBC AUTO-ENTMCNC: 32 G/DL (ref 31–36)
MCV RBC AUTO: 91 FL (ref 80–95)
MISCELLANEOUS TEST,99000: NORMAL
MONOCYTES # BLD: 0.4 K/UL (ref 0.1–1)
MONOCYTES NFR BLD: 8 % (ref 3–12)
NEUTROPHILS # BLD AUTO: 2.4 K/UL (ref 1.8–7.7)
NON-HDL CHOLESTEROL, 011976: 129 MG/DL
PLATELET # BLD AUTO: 258 K/UL (ref 140–440)
PMV BLD AUTO: 10.5 FL (ref 9–13)
POTASSIUM SERPL-SCNC: 3.8 MMOL/L (ref 3.5–5.5)
PROT SERPL-MCNC: 6.8 G/DL (ref 6.4–8.3)
PSA, EXTERNAL: 1.5
RBC # BLD AUTO: 4.67 M/UL (ref 3.8–5.8)
SENT TO, 434: NORMAL
SODIUM SERPL-SCNC: 140 MMOL/L (ref 133–145)
T4 FREE SERPL-MCNC: 1.1 NG/DL (ref 0.9–1.8)
TEST NAME, 435: NORMAL
TRIGL SERPL-MCNC: 279 MG/DL (ref 40–149)
TSH SERPL DL<=0.005 MIU/L-ACNC: 1.8 MCU/ML (ref 0.27–4.2)
VIT B12 SERPL-MCNC: 659 PG/ML (ref 211–911)
VLDLC SERPL CALC-MCNC: 56 MG/DL (ref 8–30)
WBC # BLD AUTO: 4.9 K/UL (ref 4–11)

## 2022-01-13 NOTE — TELEPHONE ENCOUNTER
Orders placed for FASTING blodwork. Please inform patient.
Pt requesting labs be ordered to be drawn prior to appointment next week(CPE). Please review and order accordingly.
patient

## 2022-03-19 PROBLEM — R73.09 ELEVATED HEMOGLOBIN A1C: Status: ACTIVE | Noted: 2018-03-14

## 2022-03-19 PROBLEM — J45.21 MILD INTERMITTENT ASTHMA WITH ACUTE EXACERBATION: Status: ACTIVE | Noted: 2018-07-30

## 2022-03-19 PROBLEM — E55.9 VITAMIN D INSUFFICIENCY: Status: ACTIVE | Noted: 2018-03-14

## 2022-05-13 ENCOUNTER — NURSE TRIAGE (OUTPATIENT)
Dept: OTHER | Facility: CLINIC | Age: 52
End: 2022-05-13

## 2022-05-13 NOTE — TELEPHONE ENCOUNTER
Received call from Jalil at [facility] with [employer] Complaint. Subjective: Caller states \"I have been having SOB\"     Current Symptoms: SOB even with talking. Onset: 3-4 months ago; intermittent, worsening    Associated Symptoms: NA    Pain Severity: 0/10; ;     Temperature: denies fever     What has been tried:     LMP: NA Pregnant: NA    Recommended disposition: Go to Office Now    Care advice provided, patient verbalizes understanding; denies any other questions or concerns; instructed to call back for any new or worsening symptoms. Patient/Caller agrees with recommended disposition; writer provided warm transfer to Hanh Reynolds at [facility] for appointment scheduling    Attention Provider: Thank you for allowing me to participate in the care of your patient. The patient was connected to triage in response to information provided to the ECC. Please do not respond through this encounter as the response is not directed to a shared pool.         Reason for Disposition   Patient wants to be seen    Protocols used: BREATHING DIFFICULTY-ADULT-OH

## 2022-06-02 ENCOUNTER — TELEPHONE (OUTPATIENT)
Dept: FAMILY MEDICINE CLINIC | Age: 52
End: 2022-06-02

## 2022-06-02 NOTE — TELEPHONE ENCOUNTER
----- Message from Luis Eugeniamisty sent at 2022 11:57 AM EDT -----  Subject: Appointment Request    Reason for Call: New Patient Request Appointment    QUESTIONS  Type of Appointment? New Patient/New to Provider  Reason for appointment request? No appointments available during search  Additional Information for Provider? Pt had an appt today to Establish   Care with Dr. Elizabeth Gamez. He was previously seeing Dr Lia Harley. He had a   family emergency and had to take a trip up to [place]. He was looking   to reschedule his new to provider appt. He will be back in town tomorrow. Please call to discuss. ---------------------------------------------------------------------------  --------------  Isaias GONZALEZ  What is the best way for the office to contact you? OK to leave message on   voicemail  Preferred Call Back Phone Number? [phone]  ---------------------------------------------------------------------------  --------------  SCRIPT ANSWERS  Relationship to Patient? Self  Specialty Confirmation? Primary Care  Is this the first appointment to establish care for a ? No  Have you been diagnosed with, awaiting test results for, or told that you   are suspected of having COVID-19 (Coronavirus)? (If patient has tested   negative or was tested as a requirement for work, school, or travel and   not based on symptoms, answer no)? No  Within the past 10 days have you developed any of the following symptoms   (answer no if symptoms have been present longer than 10 days or began   more than 10 days ago)? Fever or Chills, Cough, Shortness of breath or   difficulty breathing, Loss of taste or smell, Sore throat, Nasal   congestion, Sneezing or runny nose, Fatigue or generalized body aches   (answer no if pain is specific to a body part e.g. back pain), Diarrhea,   Headache? No  Have you had close contact with someone with COVID-19 in the last 7 days?    No  (Service Expert  click yes below to proceed with [employer] [employer] As Usual   Scheduling)?  Yes

## 2022-06-10 ENCOUNTER — APPOINTMENT (OUTPATIENT)
Dept: FAMILY MEDICINE CLINIC | Age: 52
End: 2022-06-10

## 2022-06-10 ENCOUNTER — OFFICE VISIT (OUTPATIENT)
Dept: FAMILY MEDICINE CLINIC | Age: 52
End: 2022-06-10
Payer: COMMERCIAL

## 2022-06-10 VITALS
HEART RATE: 69 BPM | RESPIRATION RATE: 15 BRPM | HEIGHT: 68 IN | WEIGHT: 204 LBS | DIASTOLIC BLOOD PRESSURE: 78 MMHG | TEMPERATURE: 97.7 F | BODY MASS INDEX: 30.92 KG/M2 | SYSTOLIC BLOOD PRESSURE: 138 MMHG | OXYGEN SATURATION: 95 %

## 2022-06-10 DIAGNOSIS — Z11.59 NEED FOR HEPATITIS C SCREENING TEST: ICD-10-CM

## 2022-06-10 DIAGNOSIS — Z80.42 FAMILY HISTORY OF PROSTATE CANCER: ICD-10-CM

## 2022-06-10 DIAGNOSIS — E55.9 VITAMIN D INSUFFICIENCY: ICD-10-CM

## 2022-06-10 DIAGNOSIS — Z12.5 SCREENING PSA (PROSTATE SPECIFIC ANTIGEN): ICD-10-CM

## 2022-06-10 DIAGNOSIS — E78.1 HYPERTRIGLYCERIDEMIA: ICD-10-CM

## 2022-06-10 DIAGNOSIS — R73.09 ELEVATED HEMOGLOBIN A1C: ICD-10-CM

## 2022-06-10 DIAGNOSIS — J45.20 MILD INTERMITTENT REACTIVE AIRWAY DISEASE WITHOUT COMPLICATION: ICD-10-CM

## 2022-06-10 DIAGNOSIS — Z00.00 ROUTINE ADULT HEALTH MAINTENANCE: ICD-10-CM

## 2022-06-10 DIAGNOSIS — Z23 ENCOUNTER FOR IMMUNIZATION: ICD-10-CM

## 2022-06-10 DIAGNOSIS — Z00.00 ROUTINE ADULT HEALTH MAINTENANCE: Primary | ICD-10-CM

## 2022-06-10 DIAGNOSIS — Z12.11 COLON CANCER SCREENING: ICD-10-CM

## 2022-06-10 DIAGNOSIS — R06.83 LOUD SNORING: ICD-10-CM

## 2022-06-10 DIAGNOSIS — R40.0 DAYTIME SOMNOLENCE: ICD-10-CM

## 2022-06-10 PROCEDURE — 90750 HZV VACC RECOMBINANT IM: CPT | Performed by: LEGAL MEDICINE

## 2022-06-10 PROCEDURE — 99396 PREV VISIT EST AGE 40-64: CPT | Performed by: LEGAL MEDICINE

## 2022-06-10 RX ORDER — ALBUTEROL SULFATE 0.83 MG/ML
2.5 SOLUTION RESPIRATORY (INHALATION) ONCE
Qty: 1 EACH | Refills: 0 | Status: SHIPPED | OUTPATIENT
Start: 2022-06-10 | End: 2022-06-10

## 2022-06-10 RX ORDER — ALBUTEROL SULFATE 90 UG/1
1-2 AEROSOL, METERED RESPIRATORY (INHALATION)
Qty: 1 EACH | Refills: 5 | Status: SHIPPED | OUTPATIENT
Start: 2022-06-10 | End: 2022-07-10

## 2022-06-10 NOTE — PROGRESS NOTES
Romeo Gates is a 50 y.o. male (: 1970) presenting to address:    Chief Complaint   Patient presents with    Transfer Of Care       Vitals:    06/10/22 0938   BP: 138/78   Pulse: 69   Resp: 15   Temp: 97.7 °F (36.5 °C)   TempSrc: Temporal   SpO2: 95%   Weight: 204 lb (92.5 kg)   Height: 5' 7.99\" (1.727 m)   PainSc:   0 - No pain       Hearing/Vision:   No exam data present    Learning Assessment:     Learning Assessment 3/12/2014   PRIMARY LEARNER Patient   HIGHEST LEVEL OF EDUCATION - PRIMARY LEARNER  GRADUATED HIGH SCHOOL OR GED   BARRIERS PRIMARY LEARNER NONE   PRIMARY LANGUAGE ENGLISH   LEARNER PREFERENCE PRIMARY READING   ANSWERED BY self   RELATIONSHIP SELF     Depression Screening:     3 most recent PHQ Screens 6/10/2022   PHQ Not Done -   Little interest or pleasure in doing things Not at all   Feeling down, depressed, irritable, or hopeless Not at all   Total Score PHQ 2 0   Trouble falling or staying asleep, or sleeping too much -   Feeling tired or having little energy -   Poor appetite, weight loss, or overeating -   Feeling bad about yourself - or that you are a failure or have let yourself or your family down -   Trouble concentrating on things such as school, work, reading, or watching TV -   Moving or speaking so slowly that other people could have noticed; or the opposite being so fidgety that others notice -   Thoughts of being better off dead, or hurting yourself in some way -   PHQ 9 Score -   How difficult have these problems made it for you to do your work, take care of your home and get along with others -     Fall Risk Assessment:     Fall Risk Assessment, last 12 mths 6/15/2021   Able to walk? Yes   Fall in past 12 months? 0   Do you feel unsteady? 0   Are you worried about falling 0     Abuse Screening:     Abuse Screening Questionnaire 6/15/2021   Do you ever feel afraid of your partner? N   Are you in a relationship with someone who physically or mentally threatens you?  N   Is it safe for you to go home? Y     ADL Assessment:     ADL Assessment 7/18/2018   Feeding yourself No Help Needed   Getting from bed to chair No Help Needed   Getting dressed No Help Needed   Bathing or showering No Help Needed   Walk across the room (includes cane/walker) No Help Needed   Using the telphone No Help Needed   Taking your medications No Help Needed   Preparing meals No Help Needed   Managing money (expenses/bills) No Help Needed   Moderately strenuous housework (laundry) No Help Needed   Shopping for personal items (toiletries/medicines) No Help Needed   Shopping for groceries No Help Needed   Driving No Help Needed   Climbing a flight of stairs No Help Needed   Getting to places beyond walking distances No Help Needed        Coordination of Care Questionaire:   1. \"Have you been to the ER, urgent care clinic since your last visit? Hospitalized since your last visit? \" No    2. \"Have you seen or consulted any other health care providers outside of the [address] [place] [address] [place] since your last visit? \" No     3. For patients aged 39-70: Has the patient had a colonoscopy / FIT/ Cologuard? No    If the patient is female:    4. For patients aged 41-77: Has the patient had a mammogram within the past 2 years? NA - based on age or sex  See top three    5. For patients aged 21-65: Has the patient had a pap smear? NA - based on age or sex    Advanced Directive:   1. Do you have an Advanced Directive? NO    2. Would you like information on Advanced Directives? NO    Immunization of the Shingrix vaccine was administered 6/10/2022 by Jer Martínez LPN. Patient tolerated procedure well. No reactions noted.

## 2022-06-10 NOTE — PROGRESS NOTES
Mohamud Johnson     Chief Complaint   Patient presents with    Transfer Of Care     Vitals:    06/10/22 0938   BP: 138/78   Pulse: 69   Resp: 15   Temp: 97.7 °F (36.5 °C)   TempSrc: Temporal   SpO2: 95%   Weight: 204 lb (92.5 kg)   Height: 5' 7.99\" (1.727 m)   PainSc:   0 - No pain         HPI: [facility] establish care with a new provider and to get on complete physical examination and blood work    He had his last physical was on June and and he confirmed his insurance that he can do it today  He has no complaints  He has family history of colon cancer in his grandfather in his 76s and prostate cancer of his dad also around the 76s and he would like to have screening for these    He drinks  occasionally once or twice a month and does not smoke cigarette  Is exercising regularly 4-5 times a week    Is requesting referral to get a sleep study he was referred in the past but the study was not done he has loud snoring and daytime somnolence    Past Medical History:   Diagnosis Date    Hypercholesterolemia      Past Surgical History:   Procedure Laterality Date    [de-identified] APPENDECTOMY  age 27     Social History     Tobacco Use    Smoking status: Never Smoker    Smokeless tobacco: Never Used   Substance Use Topics    Alcohol use: Yes     Comment: less than 1x/month       Family History   Problem Relation Age of Onset    Prostate Cancer Father         prostate       Review of Systems   Constitutional: Negative for chills, fever, malaise/fatigue and weight loss. HENT: Negative for congestion, ear discharge, ear pain, hearing loss, nosebleeds, sinus pain and sore throat. Eyes: Negative for blurred vision, double vision and discharge. Respiratory: Negative for cough, hemoptysis, sputum production, shortness of breath and wheezing. Cardiovascular: Negative for chest pain, palpitations, claudication and leg swelling.    Gastrointestinal: Negative for abdominal pain, blood in stool, constipation, diarrhea, melena, nausea and vomiting. Genitourinary: Negative for dysuria, flank pain, frequency, hematuria and urgency. Musculoskeletal: Negative for back pain, falls, joint pain, myalgias and neck pain. Skin: Negative for itching and rash. Neurological: Negative for dizziness, tingling, sensory change, speech change, focal weakness, seizures, loss of consciousness, weakness and headaches. Psychiatric/Behavioral: Negative for depression, hallucinations, substance abuse and suicidal ideas. The patient is not nervous/anxious and does not have insomnia. Physical Exam  Vitals and nursing note reviewed. Constitutional:       General: He is not in acute distress. Appearance: He is well-developed. He is not diaphoretic. HENT:      Head: Normocephalic and atraumatic. Right Ear: Tympanic membrane, ear canal and external ear normal. There is no impacted cerumen. Left Ear: Tympanic membrane, ear canal and external ear normal. There is no impacted cerumen. Mouth/Throat:      Pharynx: No oropharyngeal exudate. Eyes:      General: No scleral icterus. Right eye: No discharge. Left eye: No discharge. Conjunctiva/sclera: Conjunctivae normal.      Pupils: Pupils are equal, round, and reactive to light. Neck:      Thyroid: No thyromegaly. Cardiovascular:      Rate and Rhythm: Normal rate and regular rhythm. Heart sounds: Normal heart sounds. Pulmonary:      Effort: Pulmonary effort is normal. No respiratory distress. Breath sounds: Normal breath sounds. No stridor. No wheezing, rhonchi or rales. Chest:      Chest wall: No tenderness. Abdominal:      General: Bowel sounds are normal. There is no distension. Palpations: Abdomen is soft. There is no mass. Tenderness: There is no abdominal tenderness. There is no right CVA tenderness, left CVA tenderness, guarding or rebound. Musculoskeletal:         General: No tenderness or deformity.  Normal range of motion. Cervical back: Normal range of motion and neck supple. Right lower leg: No edema. Left lower leg: No edema. Lymphadenopathy:      Cervical: No cervical adenopathy. Skin:     General: Skin is warm and dry. Coloration: Skin is not jaundiced. Findings: No bruising, erythema, lesion or rash. Neurological:      General: No focal deficit present. Mental Status: He is alert and oriented to person, place, and time. Cranial Nerves: No cranial nerve deficit. Sensory: No sensory deficit. Motor: No weakness. Coordination: Coordination normal.      Gait: Gait normal.      Deep Tendon Reflexes: Reflexes normal.   Psychiatric:         Thought Content: Thought content normal.         Judgment: Judgment normal.          Assessment and plan     Plan of care has been discussed with the patient, he agrees to the plan and verbalized understanding. All his questions were answered  More than 50% of the time spent in this visit was counseling the patient about  illness and treatment options         1. Routine adult health maintenance    - LIPID PANEL; Future  - HEMOGLOBIN A1C W/O EAG; Future  - CBC WITH AUTOMATED DIFF; Future  - METABOLIC PANEL, COMPREHENSIVE; Future    2. Mild intermittent reactive airway disease without complication  He is a stable on current medications only albuterol as needed less than 1 times a week  - albuterol (PROVENTIL VENTOLIN) 2.5 mg /3 mL (0.083 %) nebu; 3 mL by Nebulization route once for 1 dose. Dispense: 1 Each; Refill: 0  - albuterol (PROVENTIL HFA, VENTOLIN HFA, PROAIR HFA) 90 mcg/actuation inhaler; Take 1-2 Puffs by inhalation every six (6) hours as needed for Wheezing or Shortness of Breath (cough) for up to 30 days. Dispense: 1 Each; Refill: 5    3. Family history of prostate cancer      4. Elevated hemoglobin A1c  Advised about lifestyle modifications  - HEMOGLOBIN A1C W/O EAG; Future    5.  Vitamin D insufficiency  He is currently not on any vitamin D supplements  - VITAMIN D, 25 HYDROXY; Future    6. Loud snoring    - SLEEP MEDICINE REFERRAL    7. Colon cancer screening    - REFERRAL TO GASTROENTEROLOGY    8. Screening PSA (prostate specific antigen)    - PSA SCREENING (SCREENING); Future    9. Hypertriglyceridemia    - LIPID PANEL; Future  - METABOLIC PANEL, COMPREHENSIVE; Future    10. Need for hepatitis C screening test    - HEPATITIS C AB; Future    11. Daytime somnolence    - SLEEP MEDICINE REFERRAL    12. Encounter for immunization  Vaccine was given with no complications  - ZOSTER, SHINGRIX, (18 YRS +), IM    Current Outpatient Medications   Medication Sig Dispense Refill    albuterol (PROVENTIL VENTOLIN) 2.5 mg /3 mL (0.083 %) nebu 3 mL by Nebulization route once for 1 dose. 1 Each 0    albuterol (PROVENTIL HFA, VENTOLIN HFA, PROAIR HFA) 90 mcg/actuation inhaler Take 1-2 Puffs by inhalation every six (6) hours as needed for Wheezing or Shortness of Breath (cough) for up to 30 days. 1 Each 5    omega-3 fatty acids-vitamin e (FISH OIL) 1,000 mg cap Take 1 Cap by mouth. Patient Active Problem List    Diagnosis Date Noted    Mild intermittent asthma with acute exacerbation 07/30/2018    Vitamin D insufficiency 03/14/2018    Elevated hemoglobin A1c 03/14/2018    Erectile dysfunction 03/12/2014    Onychomycosis of toenail 03/12/2014    Family history of prostate cancer 03/12/2014     Results for orders placed or performed in visit on 09/23/21   AMB EXT PSA   Result Value Ref Range    PSA, External 1.5      No visits with results within 3 Month(s) from this visit. Latest known visit with results is:   Abstract on 09/23/2021   Component Date Value Ref Range Status    PSA, External 06/18/2021 1.5   Final          Follow-up and Dispositions    · Return in about 6 months (around 12/10/2022).

## 2022-06-11 LAB
25(OH)D3 SERPL-MCNC: 57.2 NG/ML (ref 32–100)
A-G RATIO,AGRAT: 1.7 RATIO (ref 1.1–2.6)
ABSOLUTE LYMPHOCYTE COUNT, 10803: 1.8 K/UL (ref 1–4.8)
ALBUMIN SERPL-MCNC: 4.7 G/DL (ref 3.5–5)
ALP SERPL-CCNC: 85 U/L (ref 25–115)
ALT SERPL-CCNC: 42 U/L (ref 5–40)
ANION GAP SERPL CALC-SCNC: 13 MMOL/L (ref 3–15)
AST SERPL W P-5'-P-CCNC: 30 U/L (ref 10–37)
AVG GLU, 10930: 108 MG/DL (ref 91–123)
BASOPHILS # BLD: 0 K/UL (ref 0–0.2)
BASOPHILS NFR BLD: 1 % (ref 0–2)
BILIRUB SERPL-MCNC: 0.5 MG/DL (ref 0.2–1.2)
BUN SERPL-MCNC: 12 MG/DL (ref 6–22)
CALCIUM SERPL-MCNC: 9.2 MG/DL (ref 8.4–10.5)
CHLORIDE SERPL-SCNC: 103 MMOL/L (ref 98–110)
CHOLEST SERPL-MCNC: 160 MG/DL (ref 110–200)
CO2 SERPL-SCNC: 25 MMOL/L (ref 20–32)
CREAT SERPL-MCNC: 1 MG/DL (ref 0.5–1.2)
EOSINOPHIL # BLD: 0 K/UL (ref 0–0.5)
EOSINOPHIL NFR BLD: 1 % (ref 0–6)
ERYTHROCYTE [DISTWIDTH] IN BLOOD BY AUTOMATED COUNT: 12.8 % (ref 10–15.5)
GLOBULIN,GLOB: 2.8 G/DL (ref 2–4)
GLOMERULAR FILTRATION RATE: >60 ML/MIN/1.73 SQ.M.
GLUCOSE SERPL-MCNC: 93 MG/DL (ref 70–99)
GRANULOCYTES,GRANS: 49 % (ref 40–75)
HBA1C MFR BLD HPLC: 5.4 % (ref 4.8–5.6)
HCT VFR BLD AUTO: 43.7 % (ref 39.3–51.6)
HCV AB SER IA-ACNC: NORMAL
HDLC SERPL-MCNC: 33 MG/DL
HDLC SERPL-MCNC: 4.8 MG/DL (ref 0–5)
HGB BLD-MCNC: 14.5 G/DL (ref 13.1–17.2)
LDL/HDL RATIO,LDHD: 2.8
LDLC SERPL CALC-MCNC: 91 MG/DL (ref 50–99)
LYMPHOCYTES, LYMLT: 43 % (ref 20–45)
MCH RBC QN AUTO: 29 PG (ref 26–34)
MCHC RBC AUTO-ENTMCNC: 33 G/DL (ref 31–36)
MCV RBC AUTO: 86 FL (ref 80–95)
MONOCYTES # BLD: 0.2 K/UL (ref 0.1–1)
MONOCYTES NFR BLD: 6 % (ref 3–12)
NEUTROPHILS # BLD AUTO: 2.1 K/UL (ref 1.8–7.7)
NON-HDL CHOLESTEROL, 011976: 127 MG/DL
PLATELET # BLD AUTO: 277 K/UL (ref 140–440)
PMV BLD AUTO: 10.3 FL (ref 9–13)
POTASSIUM SERPL-SCNC: 3.8 MMOL/L (ref 3.5–5.5)
PROT SERPL-MCNC: 7.5 G/DL (ref 6.4–8.3)
PSA SERPL-MCNC: 1.85 NG/ML
RBC # BLD AUTO: 5.07 M/UL (ref 3.8–5.8)
SODIUM SERPL-SCNC: 141 MMOL/L (ref 133–145)
TRIGL SERPL-MCNC: 181 MG/DL (ref 40–149)
VLDLC SERPL CALC-MCNC: 36 MG/DL (ref 8–30)
WBC # BLD AUTO: 4.2 K/UL (ref 4–11)

## 2022-06-13 NOTE — PROGRESS NOTES
Results are within normal except liver function mild elevation to reduce alcohol intake and advise healthy eating

## 2023-05-10 ENCOUNTER — TELEPHONE (OUTPATIENT)
Dept: FAMILY MEDICINE CLINIC | Facility: CLINIC | Age: 53
End: 2023-05-10

## 2023-10-11 ENCOUNTER — OFFICE VISIT (OUTPATIENT)
Facility: CLINIC | Age: 53
End: 2023-10-11
Payer: COMMERCIAL

## 2023-10-11 VITALS
BODY MASS INDEX: 30.13 KG/M2 | DIASTOLIC BLOOD PRESSURE: 87 MMHG | WEIGHT: 192 LBS | HEIGHT: 67 IN | OXYGEN SATURATION: 98 % | HEART RATE: 60 BPM | RESPIRATION RATE: 17 BRPM | SYSTOLIC BLOOD PRESSURE: 144 MMHG

## 2023-10-11 DIAGNOSIS — Z80.42 FAMILY HX OF PROSTATE CANCER: ICD-10-CM

## 2023-10-11 DIAGNOSIS — Z23 NEED FOR VACCINATION: ICD-10-CM

## 2023-10-11 DIAGNOSIS — I10 PRIMARY HYPERTENSION: ICD-10-CM

## 2023-10-11 DIAGNOSIS — Z11.59 ENCOUNTER FOR HEPATITIS C SCREENING TEST FOR LOW RISK PATIENT: ICD-10-CM

## 2023-10-11 DIAGNOSIS — N52.2 DRUG-INDUCED ERECTILE DYSFUNCTION: ICD-10-CM

## 2023-10-11 DIAGNOSIS — Z76.89 ENCOUNTER TO ESTABLISH CARE: Primary | ICD-10-CM

## 2023-10-11 DIAGNOSIS — Z12.5 PROSTATE CANCER SCREENING: ICD-10-CM

## 2023-10-11 DIAGNOSIS — F43.22 ADJUSTMENT DISORDER WITH ANXIOUS MOOD: ICD-10-CM

## 2023-10-11 DIAGNOSIS — Z11.3 ROUTINE SCREENING FOR STI (SEXUALLY TRANSMITTED INFECTION): ICD-10-CM

## 2023-10-11 PROCEDURE — 99204 OFFICE O/P NEW MOD 45 MIN: CPT | Performed by: STUDENT IN AN ORGANIZED HEALTH CARE EDUCATION/TRAINING PROGRAM

## 2023-10-11 PROCEDURE — 90471 IMMUNIZATION ADMIN: CPT | Performed by: STUDENT IN AN ORGANIZED HEALTH CARE EDUCATION/TRAINING PROGRAM

## 2023-10-11 PROCEDURE — 3077F SYST BP >= 140 MM HG: CPT | Performed by: STUDENT IN AN ORGANIZED HEALTH CARE EDUCATION/TRAINING PROGRAM

## 2023-10-11 PROCEDURE — 3079F DIAST BP 80-89 MM HG: CPT | Performed by: STUDENT IN AN ORGANIZED HEALTH CARE EDUCATION/TRAINING PROGRAM

## 2023-10-11 PROCEDURE — 90674 CCIIV4 VAC NO PRSV 0.5 ML IM: CPT | Performed by: STUDENT IN AN ORGANIZED HEALTH CARE EDUCATION/TRAINING PROGRAM

## 2023-10-11 SDOH — ECONOMIC STABILITY: INCOME INSECURITY: HOW HARD IS IT FOR YOU TO PAY FOR THE VERY BASICS LIKE FOOD, HOUSING, MEDICAL CARE, AND HEATING?: NOT HARD AT ALL

## 2023-10-11 SDOH — ECONOMIC STABILITY: FOOD INSECURITY: WITHIN THE PAST 12 MONTHS, THE FOOD YOU BOUGHT JUST DIDN'T LAST AND YOU DIDN'T HAVE MONEY TO GET MORE.: NEVER TRUE

## 2023-10-11 SDOH — ECONOMIC STABILITY: HOUSING INSECURITY
IN THE LAST 12 MONTHS, WAS THERE A TIME WHEN YOU DID NOT HAVE A STEADY PLACE TO SLEEP OR SLEPT IN A SHELTER (INCLUDING NOW)?: NO

## 2023-10-11 SDOH — ECONOMIC STABILITY: FOOD INSECURITY: WITHIN THE PAST 12 MONTHS, YOU WORRIED THAT YOUR FOOD WOULD RUN OUT BEFORE YOU GOT MONEY TO BUY MORE.: NEVER TRUE

## 2023-10-11 ASSESSMENT — ANXIETY QUESTIONNAIRES
GAD7 TOTAL SCORE: 4
6. BECOMING EASILY ANNOYED OR IRRITABLE: 0
3. WORRYING TOO MUCH ABOUT DIFFERENT THINGS: 1
2. NOT BEING ABLE TO STOP OR CONTROL WORRYING: 1
7. FEELING AFRAID AS IF SOMETHING AWFUL MIGHT HAPPEN: 0
4. TROUBLE RELAXING: 2
1. FEELING NERVOUS, ANXIOUS, OR ON EDGE: 0
5. BEING SO RESTLESS THAT IT IS HARD TO SIT STILL: 0

## 2023-10-11 ASSESSMENT — PATIENT HEALTH QUESTIONNAIRE - PHQ9
7. TROUBLE CONCENTRATING ON THINGS, SUCH AS READING THE NEWSPAPER OR WATCHING TELEVISION: 1
SUM OF ALL RESPONSES TO PHQ QUESTIONS 1-9: 6
3. TROUBLE FALLING OR STAYING ASLEEP: 2
2. FEELING DOWN, DEPRESSED OR HOPELESS: 1
SUM OF ALL RESPONSES TO PHQ QUESTIONS 1-9: 6
5. POOR APPETITE OR OVEREATING: 1
6. FEELING BAD ABOUT YOURSELF - OR THAT YOU ARE A FAILURE OR HAVE LET YOURSELF OR YOUR FAMILY DOWN: 1
4. FEELING TIRED OR HAVING LITTLE ENERGY: 0
9. THOUGHTS THAT YOU WOULD BE BETTER OFF DEAD, OR OF HURTING YOURSELF: 0
SUM OF ALL RESPONSES TO PHQ QUESTIONS 1-9: 6
1. LITTLE INTEREST OR PLEASURE IN DOING THINGS: 0
SUM OF ALL RESPONSES TO PHQ9 QUESTIONS 1 & 2: 1
SUM OF ALL RESPONSES TO PHQ QUESTIONS 1-9: 6
8. MOVING OR SPEAKING SO SLOWLY THAT OTHER PEOPLE COULD HAVE NOTICED. OR THE OPPOSITE, BEING SO FIGETY OR RESTLESS THAT YOU HAVE BEEN MOVING AROUND A LOT MORE THAN USUAL: 0

## 2023-10-11 ASSESSMENT — ENCOUNTER SYMPTOMS
ABDOMINAL PAIN: 0
SHORTNESS OF BREATH: 0
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
## 2023-10-12 LAB
A/G RATIO: 1.4 RATIO (ref 1.1–2.6)
ALBUMIN SERPL-MCNC: 4.6 G/DL (ref 3.5–5)
ALP BLD-CCNC: 89 U/L (ref 25–115)
ALT SERPL-CCNC: 27 U/L (ref 5–40)
ANION GAP SERPL CALCULATED.3IONS-SCNC: 13 MMOL/L (ref 3–15)
AST SERPL-CCNC: 21 U/L (ref 10–37)
AVERAGE GLUCOSE: 109 MG/DL (ref 91–123)
BASOPHILS # BLD: 1 % (ref 0–2)
BASOPHILS ABSOLUTE: 0 K/UL (ref 0–0.2)
BILIRUB SERPL-MCNC: 0.4 MG/DL (ref 0.2–1.2)
BUN BLDV-MCNC: 13 MG/DL (ref 6–22)
CALCIUM SERPL-MCNC: 9.5 MG/DL (ref 8.4–10.5)
CHLORIDE BLD-SCNC: 104 MMOL/L (ref 98–110)
CHOLESTEROL/HDL RATIO: 5.1 (ref 0–5)
CHOLESTEROL: 184 MG/DL (ref 110–200)
CO2: 26 MMOL/L (ref 20–32)
CREAT SERPL-MCNC: 1 MG/DL (ref 0.5–1.2)
EOSINOPHIL # BLD: 1 % (ref 0–6)
EOSINOPHILS ABSOLUTE: 0.1 K/UL (ref 0–0.5)
GLOBULIN: 3.2 G/DL (ref 2–4)
GLOMERULAR FILTRATION RATE: >60 ML/MIN/1.73 SQ.M.
GLUCOSE: 86 MG/DL (ref 70–99)
HBA1C MFR BLD: 5.4 % (ref 4.8–5.6)
HCT VFR BLD CALC: 44.3 % (ref 39.3–51.6)
HDLC SERPL-MCNC: 36 MG/DL
HEMOGLOBIN: 14.6 G/DL (ref 13.1–17.2)
HEPATITIS C ANTIBODY: NORMAL
LDL CHOLESTEROL CALCULATED: 123 MG/DL (ref 50–99)
LDL/HDL RATIO: 3.4
LYMPHOCYTES # BLD: 46 % (ref 20–45)
LYMPHOCYTES ABSOLUTE: 2 K/UL (ref 1–4.8)
MCH RBC QN AUTO: 29 PG (ref 26–34)
MCHC RBC AUTO-ENTMCNC: 33 G/DL (ref 31–36)
MCV RBC AUTO: 88 FL (ref 80–95)
MONOCYTES ABSOLUTE: 0.3 K/UL (ref 0.1–1)
MONOCYTES: 6 % (ref 3–12)
NEUTROPHILS ABSOLUTE: 2 K/UL (ref 1.8–7.7)
NEUTROPHILS: 46 % (ref 40–75)
NON-HDL CHOLESTEROL: 148 MG/DL
PDW BLD-RTO: 13.5 % (ref 10–15.5)
PLATELET # BLD: 250 K/UL (ref 140–440)
PMV BLD AUTO: 10.2 FL (ref 9–13)
POTASSIUM SERPL-SCNC: 3.9 MMOL/L (ref 3.5–5.5)
PROSTATE SPECIFIC ANTIGEN: 1.96 NG/ML
RBC: 5.06 M/UL (ref 3.8–5.8)
SODIUM BLD-SCNC: 143 MMOL/L (ref 133–145)
TOTAL PROTEIN: 7.8 G/DL (ref 6.4–8.3)
TRIGL SERPL-MCNC: 125 MG/DL (ref 40–149)
VLDLC SERPL CALC-MCNC: 25 MG/DL (ref 8–30)
WBC: 4.3 K/UL (ref 4–11)

## 2023-12-11 ENCOUNTER — OFFICE VISIT (OUTPATIENT)
Facility: CLINIC | Age: 53
End: 2023-12-11
Payer: COMMERCIAL

## 2023-12-11 VITALS
RESPIRATION RATE: 17 BRPM | SYSTOLIC BLOOD PRESSURE: 149 MMHG | HEART RATE: 81 BPM | TEMPERATURE: 98.4 F | DIASTOLIC BLOOD PRESSURE: 88 MMHG | BODY MASS INDEX: 30.76 KG/M2 | HEIGHT: 67 IN | OXYGEN SATURATION: 95 % | WEIGHT: 196 LBS

## 2023-12-11 DIAGNOSIS — F43.22 ADJUSTMENT DISORDER WITH ANXIOUS MOOD: ICD-10-CM

## 2023-12-11 DIAGNOSIS — Z23 NEED FOR VACCINATION: ICD-10-CM

## 2023-12-11 DIAGNOSIS — I10 PRIMARY HYPERTENSION: Primary | ICD-10-CM

## 2023-12-11 DIAGNOSIS — N52.2 DRUG-INDUCED ERECTILE DYSFUNCTION: ICD-10-CM

## 2023-12-11 PROCEDURE — 3077F SYST BP >= 140 MM HG: CPT | Performed by: STUDENT IN AN ORGANIZED HEALTH CARE EDUCATION/TRAINING PROGRAM

## 2023-12-11 PROCEDURE — 90471 IMMUNIZATION ADMIN: CPT | Performed by: STUDENT IN AN ORGANIZED HEALTH CARE EDUCATION/TRAINING PROGRAM

## 2023-12-11 PROCEDURE — 3079F DIAST BP 80-89 MM HG: CPT | Performed by: STUDENT IN AN ORGANIZED HEALTH CARE EDUCATION/TRAINING PROGRAM

## 2023-12-11 PROCEDURE — 90750 HZV VACC RECOMBINANT IM: CPT | Performed by: STUDENT IN AN ORGANIZED HEALTH CARE EDUCATION/TRAINING PROGRAM

## 2023-12-11 PROCEDURE — 99214 OFFICE O/P EST MOD 30 MIN: CPT | Performed by: STUDENT IN AN ORGANIZED HEALTH CARE EDUCATION/TRAINING PROGRAM

## 2023-12-11 RX ORDER — AMLODIPINE BESYLATE 5 MG/1
5 TABLET ORAL DAILY
Qty: 60 TABLET | Refills: 1 | Status: SHIPPED | OUTPATIENT
Start: 2023-12-11

## 2023-12-11 ASSESSMENT — PATIENT HEALTH QUESTIONNAIRE - PHQ9
SUM OF ALL RESPONSES TO PHQ9 QUESTIONS 1 & 2: 6
1. LITTLE INTEREST OR PLEASURE IN DOING THINGS: 3
SUM OF ALL RESPONSES TO PHQ QUESTIONS 1-9: 3
1. LITTLE INTEREST OR PLEASURE IN DOING THINGS: 1
3. TROUBLE FALLING OR STAYING ASLEEP: 0
5. POOR APPETITE OR OVEREATING: 0
SUM OF ALL RESPONSES TO PHQ QUESTIONS 1-9: 3
SUM OF ALL RESPONSES TO PHQ QUESTIONS 1-9: 3
9. THOUGHTS THAT YOU WOULD BE BETTER OFF DEAD, OR OF HURTING YOURSELF: 0
SUM OF ALL RESPONSES TO PHQ QUESTIONS 1-9: 6
4. FEELING TIRED OR HAVING LITTLE ENERGY: 0
7. TROUBLE CONCENTRATING ON THINGS, SUCH AS READING THE NEWSPAPER OR WATCHING TELEVISION: 2
8. MOVING OR SPEAKING SO SLOWLY THAT OTHER PEOPLE COULD HAVE NOTICED. OR THE OPPOSITE, BEING SO FIGETY OR RESTLESS THAT YOU HAVE BEEN MOVING AROUND A LOT MORE THAN USUAL: 0
2. FEELING DOWN, DEPRESSED OR HOPELESS: 0
SUM OF ALL RESPONSES TO PHQ QUESTIONS 1-9: 6
SUM OF ALL RESPONSES TO PHQ QUESTIONS 1-9: 6
SUM OF ALL RESPONSES TO PHQ QUESTIONS 1-9: 3
2. FEELING DOWN, DEPRESSED OR HOPELESS: 3
SUM OF ALL RESPONSES TO PHQ QUESTIONS 1-9: 6
SUM OF ALL RESPONSES TO PHQ9 QUESTIONS 1 & 2: 1
6. FEELING BAD ABOUT YOURSELF - OR THAT YOU ARE A FAILURE OR HAVE LET YOURSELF OR YOUR FAMILY DOWN: 0

## 2023-12-11 ASSESSMENT — ANXIETY QUESTIONNAIRES
4. TROUBLE RELAXING: 1
3. WORRYING TOO MUCH ABOUT DIFFERENT THINGS: 2
2. NOT BEING ABLE TO STOP OR CONTROL WORRYING: 0
5. BEING SO RESTLESS THAT IT IS HARD TO SIT STILL: 0
GAD7 TOTAL SCORE: 3
1. FEELING NERVOUS, ANXIOUS, OR ON EDGE: 0
7. FEELING AFRAID AS IF SOMETHING AWFUL MIGHT HAPPEN: 0
6. BECOMING EASILY ANNOYED OR IRRITABLE: 0

## 2023-12-11 ASSESSMENT — ENCOUNTER SYMPTOMS
ABDOMINAL PAIN: 0
SHORTNESS OF BREATH: 0

## 2023-12-11 NOTE — PROGRESS NOTES
Mike Ulloa is a 48 y.o. presents today for   Chief Complaint   Patient presents with    Follow-up     Patient here to follow up with provider        Is someone accompanying this pt? no    Is the patient using any DME equipment during [address]? no    Depression Screenin/11/2023    11:48 AM 10/11/2023    10:25 AM 6/10/2022     9:37 AM 6/15/2021     1:04 PM   PHQ-9 Questionaire   Little interest or pleasure in doing things 3 0 0 2   Feeling down, depressed, or hopeless 3 1 0 2   Trouble falling or staying asleep, or sleeping too much  2  2   Feeling tired or having little energy  0  3   Poor appetite or overeating  1  1   Feeling bad about yourself - or that you are a failure or have let yourself or your family down  1  3   Trouble concentrating on things, such as reading the newspaper or watching television  1  1   Moving or speaking so slowly that other people could have noticed. Or the opposite - being so fidgety or restless that you have been moving around a lot more than usual  0  0   Thoughts that you would be better off dead, or of hurting yourself in some way  0     PHQ-9 Total Score 6 6 0 14       Abuse Screening:       No data to display                Learning Assessment:  No question data found. Fall Risk:       No data to display                    Coordination of Care:   1. \"Have you been to the ER, urgent care clinic since your last visit? Hospitalized since your last visit? \" no    2. \"Have you seen or consulted any other health care providers outside of the [address] [place] [address] since your last visit? \" no    3. For patients aged 43-73: Has the patient had a colonoscopy / FIT/ Cologuard? Yes    If the patient is female:    4. For patients aged 43-66: Has the patient had a mammogram within the past 2 years? no    5. For patients aged 21-65: Has the patient had a pap smear? no    Health Maintenance: reviewed and discussed and ordered per Provider.     Health Maintenance Due   Topic Date Due

## 2023-12-11 NOTE — PATIENT INSTRUCTIONS
Urology [facility]    Amlodipine 5mg:   -take 1 pill in the morning   -After 2 weeks, please take 2 pills in the morning
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
## 2024-04-04 ENCOUNTER — OFFICE VISIT (OUTPATIENT)
Facility: CLINIC | Age: 54
End: 2024-04-04
Payer: COMMERCIAL

## 2024-04-04 VITALS
WEIGHT: 201.6 LBS | HEIGHT: 67 IN | DIASTOLIC BLOOD PRESSURE: 74 MMHG | HEART RATE: 74 BPM | SYSTOLIC BLOOD PRESSURE: 129 MMHG | TEMPERATURE: 97.6 F | BODY MASS INDEX: 31.64 KG/M2 | RESPIRATION RATE: 15 BRPM | OXYGEN SATURATION: 95 %

## 2024-04-04 DIAGNOSIS — N52.2 DRUG-INDUCED ERECTILE DYSFUNCTION: ICD-10-CM

## 2024-04-04 DIAGNOSIS — J45.20 MILD INTERMITTENT ASTHMA WITHOUT COMPLICATION: ICD-10-CM

## 2024-04-04 DIAGNOSIS — I10 PRIMARY HYPERTENSION: Primary | ICD-10-CM

## 2024-04-04 DIAGNOSIS — H57.89 REDNESS OF RIGHT EYE: ICD-10-CM

## 2024-04-04 DIAGNOSIS — F43.22 ADJUSTMENT DISORDER WITH ANXIOUS MOOD: ICD-10-CM

## 2024-04-04 PROCEDURE — 99214 OFFICE O/P EST MOD 30 MIN: CPT | Performed by: STUDENT IN AN ORGANIZED HEALTH CARE EDUCATION/TRAINING PROGRAM

## 2024-04-04 PROCEDURE — 3074F SYST BP LT 130 MM HG: CPT | Performed by: STUDENT IN AN ORGANIZED HEALTH CARE EDUCATION/TRAINING PROGRAM

## 2024-04-04 PROCEDURE — 3078F DIAST BP <80 MM HG: CPT | Performed by: STUDENT IN AN ORGANIZED HEALTH CARE EDUCATION/TRAINING PROGRAM

## 2024-04-04 RX ORDER — ALBUTEROL SULFATE 90 UG/1
1-2 AEROSOL, METERED RESPIRATORY (INHALATION) EVERY 6 HOURS PRN
Qty: 18 G | Refills: 2 | Status: SHIPPED | OUTPATIENT
Start: 2024-04-04

## 2024-04-04 ASSESSMENT — ENCOUNTER SYMPTOMS
SHORTNESS OF BREATH: 0
EYE REDNESS: 1

## 2024-04-04 ASSESSMENT — PATIENT HEALTH QUESTIONNAIRE - PHQ9
SUM OF ALL RESPONSES TO PHQ QUESTIONS 1-9: 0
SUM OF ALL RESPONSES TO PHQ QUESTIONS 1-9: 0
SUM OF ALL RESPONSES TO PHQ9 QUESTIONS 1 & 2: 0
SUM OF ALL RESPONSES TO PHQ QUESTIONS 1-9: 0
2. FEELING DOWN, DEPRESSED OR HOPELESS: NOT AT ALL
1. LITTLE INTEREST OR PLEASURE IN DOING THINGS: NOT AT ALL
SUM OF ALL RESPONSES TO PHQ QUESTIONS 1-9: 0

## 2024-04-04 NOTE — PROGRESS NOTES
History of Present Illness  Juan Luis Akbar is a 54 y.o. male who presents today for management of    Chief Complaint   Patient presents with    Follow-up       -pt overall stable since last visit  -reports compliance to amlodipine  -denies HA, dizziness, lightheadedness, or vision changes  -denies any erectile dysfunction, this was previously an issue with other antihypertensives in the past  -mood has been positive/stable  -pt would like his eye examined, reporting 1 month hx of R eye conjunctival redness  -pt has occasional season allergy symptoms however denies any increased lacrimation, itchy, or puffy eyes      Patient Active Problem List   Diagnosis    Elevated hemoglobin A1c    Vitamin D insufficiency    Erectile dysfunction    Family hx of prostate cancer    Mild intermittent asthma with acute exacerbation    Onychomycosis of toenail    Primary hypertension      Past Medical History:   Diagnosis Date    Hypercholesterolemia       Past Surgical History:   Procedure Laterality Date    APPENDECTOMY  age 24      Family History   Problem Relation Age of Onset    Prostate Cancer Father         prostate    Prostate Cancer Paternal Grandfather      Social History     Socioeconomic History    Marital status: Single     Spouse name: Not on file    Number of children: Not on file    Years of education: Not on file    Highest education level: Not on file   Occupational History    Not on file   Tobacco Use    Smoking status: Never    Smokeless tobacco: Never   Vaping Use    Vaping Use: Never used   Substance and Sexual Activity    Alcohol use: Yes    Drug use: Yes     Types: Marijuana (Weed)     Comment: CBD gummies    Sexual activity: Not on file   Other Topics Concern    Not on file   Social History Narrative    Not on file     Social Determinants of Health     Financial Resource Strain: Low Risk  (10/11/2023)    Overall Financial Resource Strain (CARDIA)     Difficulty of Paying Living Expenses: Not hard at all   Food 
Juan Luis Akbar is a 54 y.o. year old male who presents today for   Chief Complaint   Patient presents with    Follow-up       Is someone accompanying this pt? no    Is the patient using any DME equipment during OV? no    Depression Screenin/4/2024     8:10 AM 2023     1:25 PM 2023    11:48 AM 10/11/2023    10:25 AM 6/10/2022     9:37 AM 6/15/2021     1:04 PM   PHQ-9 Questionaire   Little interest or pleasure in doing things 0 1 3 0 0 2   Feeling down, depressed, or hopeless 0 0 3 1 0 2   Trouble falling or staying asleep, or sleeping too much  0  2  2   Feeling tired or having little energy  0  0  3   Poor appetite or overeating  0  1  1   Feeling bad about yourself - or that you are a failure or have let yourself or your family down  0  1  3   Trouble concentrating on things, such as reading the newspaper or watching television  2  1  1   Moving or speaking so slowly that other people could have noticed. Or the opposite - being so fidgety or restless that you have been moving around a lot more than usual  0  0  0   Thoughts that you would be better off dead, or of hurting yourself in some way  0  0     PHQ-9 Total Score 0 3 6 6 0 14       Abuse Screening:       No data to display                Learning Assessment:  No question data found.    Fall Risk:       No data to display                    Coordination of Care:   1. \"Have you been to the ER, urgent care clinic since your last visit?  Hospitalized since your last visit?\" no    2. \"Have you seen or consulted any other health care providers outside of the [facility] since your last visit?\" no    3. For patients aged 45-75: Has the patient had a colonoscopy / FIT/ Cologuard? N/A    If the patient is female:    4. For patients aged 40-74: Has the patient had a mammogram within the past 2 years? no    5. For patients aged 21-65: Has the patient had a pap smear? no    Health Maintenance: reviewed and discussed and ordered per 
eyes, or loss of vision  Allergy and Immunology ROS: positive for - nasal congestion, postnasal drip, seasonal allergies, and sneezing  negative for - itchy/watery eyes  Respiratory ROS: negative for - cough, shortness of breath, or wheezing  Dermatological ROS: negative for - dry skin, pruritus, or rash        Physical Exam  Vitals:    04/04/24 0810   BP: 129/74   Site: Right Upper Arm   Position: Sitting   Cuff Size: Large Adult   Pulse: 74   Resp: 15   Temp: 97.6 °F (36.4 °C)   TempSrc: Temporal   SpO2: 95%   Weight: 91.4 kg (201 lb 9.6 oz)   Height: 1.702 m (5' 7\")       General: alert, oriented, not in distress. Appropriately reactive  Head: scalp normal, atraumatic  Eyes: pupils are equal and reactive, full and intact EOM's, Red reflex intact b/l  Neuro: CN: II-XII grossly intact  Neck: supple, no JVD, no lymphadenopathy  Chest/Lungs: clear breath sounds, no wheezing or crackles. No accessory muscle use  Heart: normal rate, regular rhythm, no murmur  Extremities: no focal deformities, no edema  Skin: no active skin lesions      Laboratory/Tests:  No labs for review at this visit.    Assessment/Plan:    His unilateral medial keratitis is likely non-infectious and non-allergy-related and without signs of glaucoma. It could be due to exacerbation from primary HTN. The patient would benefit from an opthalmology consult for further work-up of hypertensive complications.  Juan Luis was seen today for follow-up.    Diagnoses and all orders for this visit:    Redness of right eye  -     External Referral To Ophthalmology    Primary hypertension    Drug-induced erectile dysfunction    Adjustment disorder with anxious mood    Mild intermittent asthma without complication    Other orders  -     albuterol sulfate HFA (PROVENTIL;VENTOLIN;PROAIR) 108 (90 Base) MCG/ACT inhaler; Inhale 1-2 puffs into the lungs every 6 hours as needed for Wheezing or Shortness of Breath      Lab review: no lab studies available for review at time

## 2024-07-08 DIAGNOSIS — I10 PRIMARY HYPERTENSION: ICD-10-CM

## 2024-07-08 RX ORDER — AMLODIPINE BESYLATE 5 MG/1
5 TABLET ORAL DAILY
Qty: 90 TABLET | Refills: 1 | Status: SHIPPED | OUTPATIENT
Start: 2024-07-08

## 2025-01-14 DIAGNOSIS — I10 PRIMARY HYPERTENSION: ICD-10-CM

## 2025-01-14 RX ORDER — AMLODIPINE BESYLATE 5 MG/1
5 TABLET ORAL DAILY
Qty: 90 TABLET | Refills: 0 | Status: SHIPPED | OUTPATIENT
Start: 2025-01-14

## 2025-01-14 NOTE — TELEPHONE ENCOUNTER
Medication(s) requesting:   Requested Prescriptions     Pending Prescriptions Disp Refills    amLODIPine (NORVASC) 5 MG tablet [Pharmacy Med Name: AMLODIPINE BESYLATE 5MG TABLETS] 90 tablet 1     Sig: TAKE 1 TABLET BY MOUTH DAILY       Last office visit:  04/04/24  Next office visit DMA: Visit date not found

## 2025-06-04 ENCOUNTER — OFFICE VISIT (OUTPATIENT)
Facility: CLINIC | Age: 55
End: 2025-06-04
Payer: COMMERCIAL

## 2025-06-04 ENCOUNTER — HOSPITAL ENCOUNTER (OUTPATIENT)
Facility: HOSPITAL | Age: 55
Setting detail: SPECIMEN
Discharge: HOME | End: 2025-06-07

## 2025-06-04 VITALS
WEIGHT: 205.6 LBS | BODY MASS INDEX: 32.27 KG/M2 | RESPIRATION RATE: 15 BRPM | HEIGHT: 67 IN | TEMPERATURE: 98.1 F | DIASTOLIC BLOOD PRESSURE: 101 MMHG | SYSTOLIC BLOOD PRESSURE: 172 MMHG | HEART RATE: 75 BPM | OXYGEN SATURATION: 95 %

## 2025-06-04 DIAGNOSIS — F51.01 PRIMARY INSOMNIA: ICD-10-CM

## 2025-06-04 DIAGNOSIS — F43.10 PTSD (POST-TRAUMATIC STRESS DISORDER): ICD-10-CM

## 2025-06-04 DIAGNOSIS — I10 PRIMARY HYPERTENSION: Primary | ICD-10-CM

## 2025-06-04 DIAGNOSIS — Z13.220 SCREENING CHOLESTEROL LEVEL: ICD-10-CM

## 2025-06-04 DIAGNOSIS — F43.22 ADJUSTMENT DISORDER WITH ANXIOUS MOOD: ICD-10-CM

## 2025-06-04 DIAGNOSIS — Z13.1 DIABETES MELLITUS SCREENING: ICD-10-CM

## 2025-06-04 LAB
A/G RATIO: 1.6 RATIO (ref 1.1–2.6)
ALBUMIN: 4.6 G/DL (ref 3.5–5)
ALP BLD-CCNC: 88 U/L (ref 25–115)
ALT SERPL-CCNC: 39 U/L (ref 5–40)
ANION GAP SERPL CALCULATED.3IONS-SCNC: 11 MMOL/L (ref 3–15)
AST SERPL-CCNC: 26 U/L (ref 10–37)
BASOPHILS # BLD: 1 % (ref 0–2)
BASOPHILS ABSOLUTE: 0 K/UL (ref 0–0.2)
BILIRUB SERPL-MCNC: 0.4 MG/DL (ref 0.2–1.2)
BUN BLDV-MCNC: 11 MG/DL (ref 6–22)
CALCIUM SERPL-MCNC: 8.7 MG/DL (ref 8.4–10.5)
CHLORIDE BLD-SCNC: 103 MMOL/L (ref 98–110)
CHOLESTEROL, TOTAL: 179 MG/DL (ref 110–200)
CHOLESTEROL/HDL RATIO: 5.6 (ref 0–5)
CO2: 25 MMOL/L (ref 20–32)
CREAT SERPL-MCNC: 1.1 MG/DL (ref 0.5–1.2)
EOSINOPHIL # BLD: 2 % (ref 0–6)
EOSINOPHILS ABSOLUTE: 0.1 K/UL (ref 0–0.5)
ESTIMATED AVERAGE GLUCOSE: 115 MG/DL (ref 91–123)
GFR, ESTIMATED: 76.8 ML/MIN/1.73 SQ.M.
GLOBULIN: 2.8 G/DL (ref 2–4)
GLUCOSE: 93 MG/DL (ref 70–99)
HBA1C MFR BLD: 5.6 % (ref 4.8–5.6)
HCT VFR BLD CALC: 44.3 % (ref 39.3–51.6)
HDLC SERPL-MCNC: 32 MG/DL
HEMOGLOBIN: 14.8 G/DL (ref 13.1–17.2)
LDL CHOLESTEROL: 119 MG/DL (ref 50–99)
LDL/HDL RATIO: 3.7
LYMPHOCYTES # BLD: 44 % (ref 20–45)
LYMPHOCYTES ABSOLUTE: 1.7 K/UL (ref 1–4.8)
MCH RBC QN AUTO: 29 PG (ref 26–34)
MCHC RBC AUTO-ENTMCNC: 33 G/DL (ref 31–36)
MCV RBC AUTO: 86 FL (ref 80–95)
MONOCYTES ABSOLUTE: 0.3 K/UL (ref 0.1–1)
MONOCYTES: 7 % (ref 3–12)
NEUTROPHILS ABSOLUTE: 1.8 K/UL (ref 1.8–7.7)
NEUTROPHILS SEGMENTED: 46 % (ref 40–75)
NON-HDL CHOLESTEROL: 147 MG/DL
PDW BLD-RTO: 13.2 % (ref 10–15.5)
PLATELET # BLD: 230 K/UL (ref 140–440)
PMV BLD AUTO: 10 FL (ref 9–13)
POTASSIUM SERPL-SCNC: 4 MMOL/L (ref 3.5–5.5)
RBC # BLD: 5.15 M/UL (ref 3.8–5.8)
SENTARA SPECIMEN COLLECTION: NORMAL
SODIUM BLD-SCNC: 139 MMOL/L (ref 133–145)
TOTAL PROTEIN: 7.4 G/DL (ref 6.4–8.3)
TRIGL SERPL-MCNC: 138 MG/DL (ref 40–149)
VLDLC SERPL CALC-MCNC: 28 MG/DL (ref 8–30)
WBC # BLD: 3.9 K/UL (ref 4–11)

## 2025-06-04 PROCEDURE — 3077F SYST BP >= 140 MM HG: CPT | Performed by: STUDENT IN AN ORGANIZED HEALTH CARE EDUCATION/TRAINING PROGRAM

## 2025-06-04 PROCEDURE — 3079F DIAST BP 80-89 MM HG: CPT | Performed by: STUDENT IN AN ORGANIZED HEALTH CARE EDUCATION/TRAINING PROGRAM

## 2025-06-04 PROCEDURE — 99214 OFFICE O/P EST MOD 30 MIN: CPT | Performed by: STUDENT IN AN ORGANIZED HEALTH CARE EDUCATION/TRAINING PROGRAM

## 2025-06-04 PROCEDURE — 99001 SPECIMEN HANDLING PT-LAB: CPT

## 2025-06-04 RX ORDER — AMLODIPINE BESYLATE 5 MG/1
5 TABLET ORAL DAILY
Qty: 90 TABLET | Refills: 2 | Status: SHIPPED | OUTPATIENT
Start: 2025-06-04

## 2025-06-04 SDOH — ECONOMIC STABILITY: FOOD INSECURITY: WITHIN THE PAST 12 MONTHS, YOU WORRIED THAT YOUR FOOD WOULD RUN OUT BEFORE YOU GOT MONEY TO BUY MORE.: NEVER TRUE

## 2025-06-04 SDOH — ECONOMIC STABILITY: FOOD INSECURITY: WITHIN THE PAST 12 MONTHS, THE FOOD YOU BOUGHT JUST DIDN'T LAST AND YOU DIDN'T HAVE MONEY TO GET MORE.: NEVER TRUE

## 2025-06-04 ASSESSMENT — PATIENT HEALTH QUESTIONNAIRE - PHQ9
SUM OF ALL RESPONSES TO PHQ QUESTIONS 1-9: 0
1. LITTLE INTEREST OR PLEASURE IN DOING THINGS: NOT AT ALL
2. FEELING DOWN, DEPRESSED OR HOPELESS: NOT AT ALL

## 2025-06-04 NOTE — PROGRESS NOTES
Juan Luis Akbar is a 55 y.o. year old male who presents today for   Chief Complaint   Patient presents with    Headache    Medication Refill        \"Have you been to the ER, urgent care clinic since your last visit?  Hospitalized since your last visit?\"   NO     “Have you seen or consulted any other health care providers outside our system since your last visit?”   NO             - Guerrero Garcia/DALE Pearce  [facility]  Phone: [phone]  Fax: [phone]  
   Comprehensive Metabolic Panel; Future  -     CBC with Auto Differential; Future    Primary insomnia  Adjustment disorder with anxious mood  PTSD (post-traumatic stress disorder)  -     External Referral To Psychology    Screening cholesterol level  -     Lipid Panel; Future    Diabetes mellitus screening  -     Hemoglobin A1C; Future          On this date 6/4/2025 I have spent 20 minutes reviewing previous notes, test results and face to face with the patient discussing the diagnosis and importance of compliance with the treatment plan as well as documenting on the day of the visit.    I have discussed the diagnosis with the patient and the intended plan as seen in the above orders.  The patient has received an after-visit summary and questions were answered concerning future plans.  I have discussed medication side effects and warnings with the patient as well. I have reviewed the plan of care with the patient, accepted their input and they are in agreement with the treatment goals.     Follow-up and Dispositions    Return in about 3 weeks (around 6/25/2025) for HTN f/u.       Jelly Boyer MD   June 4, 2025

## 2025-07-08 ENCOUNTER — OFFICE VISIT (OUTPATIENT)
Facility: CLINIC | Age: 55
End: 2025-07-08
Payer: COMMERCIAL

## 2025-07-08 VITALS
WEIGHT: 201 LBS | BODY MASS INDEX: 31.55 KG/M2 | TEMPERATURE: 98.4 F | HEART RATE: 78 BPM | RESPIRATION RATE: 15 BRPM | DIASTOLIC BLOOD PRESSURE: 84 MMHG | OXYGEN SATURATION: 95 % | SYSTOLIC BLOOD PRESSURE: 152 MMHG | HEIGHT: 67 IN

## 2025-07-08 DIAGNOSIS — F43.10 PTSD (POST-TRAUMATIC STRESS DISORDER): ICD-10-CM

## 2025-07-08 DIAGNOSIS — F51.01 PRIMARY INSOMNIA: ICD-10-CM

## 2025-07-08 DIAGNOSIS — I10 PRIMARY HYPERTENSION: Primary | ICD-10-CM

## 2025-07-08 PROCEDURE — 3079F DIAST BP 80-89 MM HG: CPT | Performed by: STUDENT IN AN ORGANIZED HEALTH CARE EDUCATION/TRAINING PROGRAM

## 2025-07-08 PROCEDURE — 99214 OFFICE O/P EST MOD 30 MIN: CPT | Performed by: STUDENT IN AN ORGANIZED HEALTH CARE EDUCATION/TRAINING PROGRAM

## 2025-07-08 PROCEDURE — 3077F SYST BP >= 140 MM HG: CPT | Performed by: STUDENT IN AN ORGANIZED HEALTH CARE EDUCATION/TRAINING PROGRAM

## 2025-07-08 RX ORDER — AMLODIPINE BESYLATE 10 MG/1
10 TABLET ORAL DAILY
Qty: 30 TABLET | Refills: 5 | Status: SHIPPED | OUTPATIENT
Start: 2025-07-08

## 2025-07-08 SDOH — ECONOMIC STABILITY: FOOD INSECURITY: WITHIN THE PAST 12 MONTHS, YOU WORRIED THAT YOUR FOOD WOULD RUN OUT BEFORE YOU GOT MONEY TO BUY MORE.: NEVER TRUE

## 2025-07-08 SDOH — ECONOMIC STABILITY: FOOD INSECURITY: WITHIN THE PAST 12 MONTHS, THE FOOD YOU BOUGHT JUST DIDN'T LAST AND YOU DIDN'T HAVE MONEY TO GET MORE.: NEVER TRUE

## 2025-07-08 ASSESSMENT — ENCOUNTER SYMPTOMS
SHORTNESS OF BREATH: 0
ABDOMINAL PAIN: 0

## 2025-07-08 ASSESSMENT — PATIENT HEALTH QUESTIONNAIRE - PHQ9
SUM OF ALL RESPONSES TO PHQ QUESTIONS 1-9: 0
SUM OF ALL RESPONSES TO PHQ QUESTIONS 1-9: 0
2. FEELING DOWN, DEPRESSED OR HOPELESS: NOT AT ALL
SUM OF ALL RESPONSES TO PHQ QUESTIONS 1-9: 0
1. LITTLE INTEREST OR PLEASURE IN DOING THINGS: NOT AT ALL
SUM OF ALL RESPONSES TO PHQ QUESTIONS 1-9: 0

## 2025-07-08 NOTE — PROGRESS NOTES
Juan Luis Akbar is a 55 y.o. year old male who presents today for   Chief Complaint   Patient presents with    Hypertension       \"Have you been to the ER, urgent care clinic since your last visit?  Hospitalized since your last visit?\"    no    “Have you seen or consulted any other health care providers outside our system since your last visit?”    No            Click Here for Release of Records Request    - SADAF Poe  [facility]  Phone: [phone]  Fax: [phone]

## 2025-07-08 NOTE — PROGRESS NOTES
History of Present Illness  Juan Luis Akbar is a 55 y.o. male who presents today for management of    Chief Complaint   Patient presents with    Hypertension         -Patient overall stable since last visit  -Reports compliance to BP medication  -Denies headache, dizziness, lightheadedness, vision changes  -Previously referred to initiate therapy, patient has not received phone call to schedule this  - Reporting some left knee swelling that occurred last month, swelling has since resolved.  Patient denies any pain.       Patient Active Problem List   Diagnosis    Elevated hemoglobin A1c    Vitamin D insufficiency    Erectile dysfunction    Family hx of prostate cancer    Mild intermittent asthma without complication    Onychomycosis of toenail    Primary hypertension      Past Medical History:   Diagnosis Date    Hypercholesterolemia       Past Surgical History:   Procedure Laterality Date    APPENDECTOMY  age 24      Family History   Problem Relation Age of Onset    Prostate Cancer Father         prostate    Prostate Cancer Paternal Grandfather      Social History     Socioeconomic History    Marital status: Single     Spouse name: Not on file    Number of children: Not on file    Years of education: Not on file    Highest education level: Not on file   Occupational History    Not on file   Tobacco Use    Smoking status: Never    Smokeless tobacco: Never   Vaping Use    Vaping status: Never Used   Substance and Sexual Activity    Alcohol use: Yes    Drug use: Yes     Types: Marijuana (Weed)     Comment: CBD gummies    Sexual activity: Not on file   Other Topics Concern    Not on file   Social History Narrative    Not on file     Social Drivers of Health     Financial Resource Strain: Low Risk  (10/11/2023)    Overall Financial Resource Strain (CARDIA)     Difficulty of Paying Living Expenses: Not hard at all   Food Insecurity: No Food Insecurity (7/8/2025)    Hunger Vital Sign     Worried About Running Out of Food in